# Patient Record
Sex: MALE | Race: WHITE | NOT HISPANIC OR LATINO | Employment: OTHER | URBAN - METROPOLITAN AREA
[De-identification: names, ages, dates, MRNs, and addresses within clinical notes are randomized per-mention and may not be internally consistent; named-entity substitution may affect disease eponyms.]

---

## 2017-07-25 ENCOUNTER — OFFICE VISIT (OUTPATIENT)
Dept: OPHTHALMOLOGY | Facility: CLINIC | Age: 57
End: 2017-07-25
Attending: OPHTHALMOLOGY
Payer: OTHER MISCELLANEOUS

## 2017-07-25 DIAGNOSIS — Z98.890 HISTORY OF RUPTURED GLOBE REPAIR: Primary | ICD-10-CM

## 2017-07-25 DIAGNOSIS — H35.372 ERM OS (EPIRETINAL MEMBRANE, LEFT EYE): ICD-10-CM

## 2017-07-25 DIAGNOSIS — H25.11 SENILE NUCLEAR SCLEROSIS, RIGHT: ICD-10-CM

## 2017-07-25 PROCEDURE — 92015 DETERMINE REFRACTIVE STATE: CPT | Mod: ZF

## 2017-07-25 PROCEDURE — 99214 OFFICE O/P EST MOD 30 MIN: CPT | Mod: ZF

## 2017-07-25 ASSESSMENT — CONF VISUAL FIELD
OD_NORMAL: 1
OS_NORMAL: 1

## 2017-07-25 ASSESSMENT — SLIT LAMP EXAM - LIDS
COMMENTS: NORMAL
COMMENTS: NORMAL

## 2017-07-25 ASSESSMENT — REFRACTION_MANIFEST
OD_SPHERE: +0.50
OD_CYLINDER: +0.50
OS_SPHERE: -2.00
OS_CYLINDER: +1.75
OD_ADD: +2.50
OD_AXIS: 008
OS_AXIS: 055
OS_ADD: +2.50

## 2017-07-25 ASSESSMENT — VISUAL ACUITY
OS_CC: 20/25+1
CORRECTION_TYPE: GLASSES
OD_CC: 20/20
METHOD: SNELLEN - LINEAR

## 2017-07-25 ASSESSMENT — TONOMETRY
OS_IOP_MMHG: 10
OD_IOP_MMHG: 10
IOP_METHOD: APPLANATION

## 2017-07-25 ASSESSMENT — CUP TO DISC RATIO
OD_RATIO: 0.2
OS_RATIO: 0.2

## 2017-07-25 NOTE — PROGRESS NOTES
CC -   H/o ruptured globe repair    INTERVAL HISTORY -   No new changes, vision stable    HPI -     Joce Maier is a  57 year old year-old patient presenting for h/o OGR left eye after injury with metal pin for concrete construction.      PAST OCULAR SURGERY  PPV/SBP left eye  2008 (JT)  Sutured 18.0 D CZ70BD lens left eye 2008 (Emperatriz)         ASSESSMENT & PLAN    1.  H/o OGR left eye   - s/p AC  reconstrcuction and sutured lens (Awan)   - s/p Pars plana vitrectomy (PPV)/SBP (JT)   - looks good today   - retina falt    2.  vitreous syneresis right eye   - advised S/Sx Retinal detachment     3. Tr nuclear sclerosis right eye    4.  Tr ERM OS   - not visually significant, repeat OCT next visit    5. 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 Thompson with OCT mac     Andrew Lara MD, PhD  Vitreoretinal Surgery Fellow  [facility]      ATTESTATION     Attending Physician Attestation:      Complete documentation of historical and exam elements from today's encounter can be found in the full encounter summary report (not reduplicated in this progress note).  I personally obtained the chief complaint(s) and history of present illness.  I confirmed and edited as necessary the review of systems, past medical/surgical history, family history, social history, and examination findings as documented by others; and I examined the patient myself.  I personally reviewed the relevant tests, images, and reports as documented above.  I formulated and edited as necessary the assessment and plan and discussed the findings and management plan with the patient and family        Katie Thompson MD, PhD  , Vitreoretinal Surgery  Department of Ophthalmology  [facility]  [place]

## 2017-07-25 NOTE — PATIENT INSTRUCTIONS
Please arrange to get a carotid (neck) ultrasound and a transthoracic cardiac (heart) ultrasound due to possible cholesterol plaques in your left eye

## 2017-07-25 NOTE — NURSING NOTE
Chief Complaints and History of Present Illnesses   Patient presents with     Follow Up For     OGR      HPI    Affected eye(s):  Both   Symptoms:     No blurred vision   No decreased vision   No flashes   No Dryness   No itching         Do you have eye pain now?:  No      Comments:  No VA changes or complaints. No pain or discomfort.   Elena SANTANA July 25, 2017 8:48 AM

## 2017-07-25 NOTE — MR AVS SNAPSHOT
After Visit Summary   7/25/2017    Joce Maier    MRN: [id number]           Patient Information     Date Of Birth          1960        Visit Information        Provider Department      7/25/2017 8:45 AM Katie Thompson MD Eye Clinic        Today's Diagnoses     History of ruptured globe repair    -  1    ERM OS (epiretinal membrane, left eye)        Senile nuclear sclerosis, right          Care Instructions    Please arrange to get a carotid (neck) ultrasound and a transthoracic cardiac (heart) ultrasound due to possible cholesterol plaques in your left eye          Follow-ups after your visit        Follow-up notes from your care team     Return in about 1 year (around 7/25/2018) for OCT OU.      Your next 10 appointments already scheduled     May 08, 2018  7:30 AM CDT   RETURN CORNEA with Stefan Reid MD   Eye Clinic ([facility])    [address] [place] [id number] [phone]            May 08, 2018  8:45 AM CDT   RETURN RETINA with Katie Thompson MD   Eye Clinic ([facility])    [address] [place] [id number] [phone]              Future tests that were ordered for you today     Open Future Orders        Priority Expected Expires Ordered    OCT Retina Spectralis OU (both eyes) Routine  1/26/2019 7/25/2017    OCT Retina Spectralis OU (both eyes) Routine  1/26/2019 7/25/2017            Who to contact     Please call your clinic at [phone] to:    Ask questions about your health    Make or cancel appointments    Discuss your medicines    Learn about your test results    Speak to your doctor   If you have compliments or concerns about an experience at your clinic, or if you wish to file a complaint, please contact [facility] Patient Relations at [phone] or email us at [email or web address]          Additional Information About Your Visit        Radio Waves Information     Radio Waves is an electronic gateway that provides easy, online access to your medical records. With Radio Waves, you can request a clinic appointment, read your test results, renew a prescription or communicate with your care team.     To sign up for Radio Waves visit the website at [email or web address]   You will be asked to enter the access code listed below, as well as some personal information. Please follow the directions to create your username and password.     Your access code is: [email or web address]  Expires: 2017  6:30 AM     Your access code will  in 90 days. If you need help or a new code, please contact your [facility] or call [phone] for assistance.        Care EveryWhere ID     This is your Care EveryWhere ID. This could be used by other organizations to access your [place] medical records  [id number]         Blood Pressure from Last 3 Encounters:   No data found for BP    Weight from Last 3 Encounters:   No data found for Wt               Primary Care Provider Office Phone # Fax #    [facility] [phone]       [address] [address]        Equal Access to Services     BRITNEY CARVAJAL : Hadii shira muñozo Sojarvis, waaxda luqadaha, qaybta kaalmada adecamilleyada, titi castaneda. So [facility] [phone].    ATENCIÓN: Si habla español, tiene a hernandez disposición servicios gratuitos de asistencia lingüística. Parminder al [phone].    We comply with applicable federal civil rights laws and [place] laws. We do not discriminate on the basis of race, color, national origin, age, disability sex, sexual orientation or gender identity.            Thank you!     Thank you for choosing EYE CLINIC  for your care. Our goal is always to provide you with excellent care. Hearing back from our patients is one way we can continue to improve our  services. Please take a few minutes to complete the written survey that you may receive in the mail after your visit with us. Thank you!             Your Updated Medication List - Protect others around you: Learn how to safely use, store and throw away your medicines at www.disposemymeds.org.          This list is accurate as of: 7/25/17  9:50 AM.  Always use your most recent med list.                   Brand Name Dispense Instructions for use Diagnosis    ALEVE PO      Take 1 tablet by mouth daily

## 2018-05-08 ENCOUNTER — OFFICE VISIT (OUTPATIENT)
Dept: OPHTHALMOLOGY | Facility: CLINIC | Age: 58
End: 2018-05-08
Attending: OPHTHALMOLOGY
Payer: OTHER MISCELLANEOUS

## 2018-05-08 DIAGNOSIS — Z98.890 HISTORY OF RUPTURED GLOBE REPAIR: ICD-10-CM

## 2018-05-08 DIAGNOSIS — H25.11 SENILE NUCLEAR SCLEROSIS, RIGHT: ICD-10-CM

## 2018-05-08 DIAGNOSIS — H35.372 EPIRETINAL MEMBRANE (ERM) OF LEFT EYE: ICD-10-CM

## 2018-05-08 DIAGNOSIS — H02.056 TRICHIASIS OF LEFT EYE WITHOUT ENTROPION: Primary | ICD-10-CM

## 2018-05-08 PROCEDURE — 40000269 ZZH STATISTIC NO CHARGE FACILITY FEE: Mod: ZF

## 2018-05-08 PROCEDURE — G0463 HOSPITAL OUTPT CLINIC VISIT: HCPCS | Mod: ZF

## 2018-05-08 PROCEDURE — 92015 DETERMINE REFRACTIVE STATE: CPT | Mod: ZF

## 2018-05-08 PROCEDURE — 67820 REVISE EYELASHES: CPT | Mod: ZF | Performed by: OPHTHALMOLOGY

## 2018-05-08 PROCEDURE — 92134 CPTRZ OPH DX IMG PST SGM RTA: CPT | Mod: ZF | Performed by: OPHTHALMOLOGY

## 2018-05-08 ASSESSMENT — REFRACTION_MANIFEST
OD_SPHERE: +0.50
OS_CYLINDER: +2.00
OS_SPHERE: -1.75
OS_CYLINDER: +2.00
OD_CYLINDER: +0.50
OS_ADD: +2.50
OD_CYLINDER: +0.50
OD_ADD: +2.50
OS_AXIS: 050
OS_AXIS: 050
OD_ADD: +2.50
OS_ADD: +2.50
OD_SPHERE: +0.50
OD_AXIS: 008
OS_SPHERE: -1.75
OD_AXIS: 008

## 2018-05-08 ASSESSMENT — VISUAL ACUITY
OD_CC: 20/15
METHOD: SNELLEN - LINEAR
OD_CC: 20/15
CORRECTION_TYPE: GLASSES
OS_CC: 20/25
METHOD_MR: OPP SIGNS
METHOD: SNELLEN - LINEAR
CORRECTION_TYPE: GLASSES
OS_CC+: -1
OS_CC: 20/25
OS_CC+: -1

## 2018-05-08 ASSESSMENT — TONOMETRY
OD_IOP_MMHG: 12
IOP_METHOD: ICARE
OS_IOP_MMHG: 13
OD_IOP_MMHG: 12
IOP_METHOD: ICARE
OS_IOP_MMHG: 13

## 2018-05-08 ASSESSMENT — REFRACTION_WEARINGRX
OD_CYLINDER: +0.50
OS_CYLINDER: +1.75
OD_SPHERE: +0.50
OS_CYLINDER: +1.75
OD_ADD: +2.50
OS_AXIS: 055
OD_AXIS: 008
OD_CYLINDER: +0.50
OS_ADD: +2.50
OD_SPHERE: +0.50
OS_ADD: +2.50
OD_AXIS: 008
OS_SPHERE: -2.00
OS_AXIS: 055
OS_SPHERE: -2.00
OD_ADD: +2.50

## 2018-05-08 ASSESSMENT — CONF VISUAL FIELD
OS_NORMAL: 1
OD_NORMAL: 1
OD_NORMAL: 1
OS_NORMAL: 1

## 2018-05-08 ASSESSMENT — CUP TO DISC RATIO
OD_RATIO: 0.2
OD_RATIO: 0.2
OS_RATIO: 0.2
OS_RATIO: 0.2

## 2018-05-08 ASSESSMENT — SLIT LAMP EXAM - LIDS
COMMENTS: NORMAL

## 2018-05-08 NOTE — NURSING NOTE
Chief Complaints and History of Present Illnesses   Patient presents with     Follow Up For     OGP LE      HPI    Last Eye Exam:  7/25/17   Affected eye(s):  Left   Symptoms:     No floaters   No flashes      Duration:  1 year   Frequency:  Constant       Do you have eye pain now?:  No      Comments:  Pt would like to check the health of eyes. Vision is stable and good. Denies any floaters or flashing lights. Denies any pain or discomfort. CANDY ASH, COA 7:48 AM 05/08/2018

## 2018-05-08 NOTE — NURSING NOTE
Chief Complaints and History of Present Illnesses   Patient presents with     Follow Up For     Hx OGR LE      HPI    Last Eye Exam:  7/25/17   Affected eye(s):  Left   Symptoms:     No floaters   No flashes      Duration:  1 year   Frequency:  Constant       Do you have eye pain now?:  No      Comments:  Pt would like to check the health of eyes. Vision is stable and good. Denies any floaters or flashing lights. Denies any pain or discomfort. CANDY ASH, COA 7:48 AM 05/08/2018                
unknown

## 2018-05-08 NOTE — MR AVS SNAPSHOT
After Visit Summary   2018    Joce Maier    MRN: [id number]           Patient Information     Date Of Birth          1960        Visit Information        Provider Department      2018 8:45 AM Katie Thompson MD Eye Clinic        Today's Diagnoses     History of ruptured globe repair        Epiretinal membrane (ERM) of left eye        Senile nuclear sclerosis, right           Follow-ups after your visit        Follow-up notes from your care team     Return in about 1 year (around 2019) for f/u OGR OS, OCT OU.      Future tests that were ordered for you today     Open Future Orders        Priority Expected Expires Ordered    OCT Retina Spectralis OU (both eyes) Routine  2019            Who to contact     Please call your clinic at [phone] to:    Ask questions about your health    Make or cancel appointments    Discuss your medicines    Learn about your test results    Speak to your doctor            Additional Information About Your Visit        MyChart Information     WaveCheck is an electronic gateway that provides easy, online access to your medical records. With WaveCheck, you can request a clinic appointment, read your test results, renew a prescription or communicate with your care team.     To sign up for WaveCheck visit the website at [email or web address]   You will be asked to enter the access code listed below, as well as some personal information. Please follow the directions to create your username and password.     Your access code is: [email or web address]  Expires: 2018  6:30 AM     Your access code will  in 90 days. If you need help or a new code, please contact your [facility] or call [phone] for assistance.        Care EveryWhere ID     This is your Care EveryWhere ID. This could be used by other organizations to access your [place] medical records  [id number]         Blood Pressure from Last 3  Encounters:   No data found for BP    Weight from Last 3 Encounters:   No data found for Wt              We Performed the Following     OCT Retina Spectralis OU (both eyes)        Primary Care Provider Office Phone # Fax #    [facility] [phone] [address] [facility] [address]        Equal Access to Services     BRINTEY CARVAJAL : Hadii aad ku hadasho Soomaali, waaxda luqadaha, qaybta kaalmada adeegyada, waxay dialloin haysherrien adecamille brownleeshilpajoe castaneda. So [facility] [phone].    ATENCIÓN: Si habla español, tiene a hernandez disposición servicios gratuitos de asistencia lingüística. Ll[facility] [phone].    We comply with applicable federal civil rights laws and [place] laws. We do not discriminate on the basis of race, color, national origin, age, disability, sex, sexual orientation, or gender identity.            Thank you!     Thank you for choosing EYE CLINIC  for your care. Our goal is always to provide you with excellent care. Hearing back from our patients is one way we can continue to improve our services. Please take a few minutes to complete the written survey that you may receive in the mail after your visit with us. Thank you!             Your Updated Medication List - Protect others around you: Learn how to safely use, store and throw away your medicines at www.disposemymeds.org.          This list is accurate as of 5/8/18 10:37 AM.  Always use your most recent med list.                   Brand Name Dispense Instructions for use Diagnosis    ALEVE PO      Take 1 tablet by mouth daily

## 2018-05-08 NOTE — MR AVS SNAPSHOT
After Visit Summary   2018    Joce Maier    MRN: [id number]           Patient Information     Date Of Birth          1960        Visit Information        Provider Department      2018 7:30 AM Stefan Reid MD Eye [facility]        Today's Diagnoses     Trichiasis of left eye without entropion    -  1    History of ruptured globe repair        Senile nuclear sclerosis, right           Follow-ups after your visit        Follow-up notes from your care team     Return in about 2 years (around 2020).      Who to contact     Please call your clinic at [phone] to:    Ask questions about your health    Make or cancel appointments    Discuss your medicines    Learn about your test results    Speak to your doctor            Additional Information About Your Visit        MyChart Information     Xceleron (Chapter [employer]hart is an electronic gateway that provides easy, online access to your medical records. With [employer]ship, you can request a clinic appointment, read your test results, renew a prescription or communicate with your care team.     To sign up for Speakeasy Inct visit the website at [email or web address]   You will be asked to enter the access code listed below, as well as some personal information. Please follow the directions to create your username and password.     Your access code is: [email or web address]  Expires: 2018  6:30 AM     Your access code will  in 90 days. If you need help or a new code, please contact your [facility] or call [phone] for assistance.        Care EveryWhere ID     This is your Care EveryWhere ID. This could be used by other organizations to access your [place] medical records  [id number]         Blood Pressure from Last 3 Encounters:   No data found for BP    Weight from Last 3 Encounters:   No data found for Wt              We Performed the Following     Epilation of Trichiasis, Forceps        Primary Care Provider Office  Phone # Fax #    [facility] [phone] [address] [facility] [address]        Equal Access to Services     BRITNEY CARVAJAL : Hadii aad ku hadborisjerrica Thibodeaux, florecitanina ribeiromaryamha, gretta kakoreyda dwaine, titi munroe juancamille le martha castaneda. So [facility] [phone].    ATENCIÓN: Si habla español, tiene a hernandez disposición servicios gratuitos de asistencia lingüística. Llame al [phone].    We comply with applicable federal civil rights laws and [place] laws. We do not discriminate on the basis of race, color, national origin, age, disability, sex, sexual orientation, or gender identity.            Thank you!     Thank you for choosing EYE CLINIC  for your care. Our goal is always to provide you with excellent care. Hearing back from our patients is one way we can continue to improve our services. Please take a few minutes to complete the written survey that you may receive in the mail after your visit with us. Thank you!             Your Updated Medication List - Protect others around you: Learn how to safely use, store and throw away your medicines at www.disposemymeds.org.          This list is accurate as of 5/8/18  9:23 AM.  Always use your most recent med list.                   Brand Name Dispense Instructions for use Diagnosis    ALEVE PO      Take 1 tablet by mouth daily

## 2018-05-08 NOTE — PROGRESS NOTES
I have confirmed the patient's and reviewed Past Medical History, Past Surgical History, Social History, Family History, Problem List, Medication List and agree with Tech note.    Chief Complaints and History of Present Illnesses   Patient presents with     Yearly Exam     ruptured globe repair left eye     Interval History: denies vision change, no flashes or floaters    History:   Ruptured globe: concrete pin to left eye   - 2008   - s/p Pars plana vitrectomy (PPV)/PPL/SB    - with sutured 18.0 D CZ70BD lens (Dr. Awan) 2008    Assessment/plan:       Joce Maier is a 57 year old male who presents with:     1. Ruptured globe of left eye, subsequent encounter    2. History of ruptured globe repair  Doing very well with excellent visual outcome  Sutured intraocular lens in good position  Excellent best corrected visual acuity with manifest refraction   Updated manifest refraction given  Monocular precautions given  Recommend polycarbonate lenses at all times  To see Dr. eSgura today    3. Corectopia  Does not seem bothered by mild increased light sensitivity  Monitor for now      return to clinic 1 year    Branden Jarvis MD  PGY3, Dept of Ophthalmology  Pager [phone]        ~~~~~~~~~~~~~~~~~~~~~~~~~~~~~~~~~~~~~~~~~~~~~~~~~~~~~~~~~~~~~~~~    Complete documentation of historical and exam elements from today's encounter can be found in the full encounter summary report (not reduplicated in this progress note). I personally obtained the chief complaint(s) and history of present illness.  I confirmed and edited as necessary the review of systems, past medical/surgical history, family history, social history, and examination findings as documented by others.  I examined the patient myself, and I personally reviewed the relevant tests, images, and reports as documented above. I formulated and edited as necessary the assessment and plan and discussed the findings and management plan with the patient and family.      Stefan Reid MD, MA  Director, Cornea & Anterior Segment  [facility] Department of Ophthalmology & Visual Neuroscience

## 2019-05-06 ENCOUNTER — OFFICE VISIT (OUTPATIENT)
Dept: OPHTHALMOLOGY | Facility: CLINIC | Age: 59
End: 2019-05-06
Attending: OPHTHALMOLOGY
Payer: OTHER MISCELLANEOUS

## 2019-05-06 DIAGNOSIS — H35.372 EPIRETINAL MEMBRANE (ERM) OF LEFT EYE: ICD-10-CM

## 2019-05-06 DIAGNOSIS — Z98.890 HISTORY OF RUPTURED GLOBE REPAIR: ICD-10-CM

## 2019-05-06 PROBLEM — Z80.0 FH: ESOPHAGEAL CANCER: Status: ACTIVE | Noted: 2019-05-06

## 2019-05-06 PROBLEM — K29.80 DUODENITIS: Status: ACTIVE | Noted: 2019-05-06

## 2019-05-06 PROBLEM — R10.13 DYSPEPSIA: Status: ACTIVE | Noted: 2019-05-06

## 2019-05-06 PROBLEM — R14.0 ABDOMINAL BLOATING: Status: ACTIVE | Noted: 2019-05-06

## 2019-05-06 PROCEDURE — G0463 HOSPITAL OUTPT CLINIC VISIT: HCPCS | Mod: ZF

## 2019-05-06 PROCEDURE — 92134 CPTRZ OPH DX IMG PST SGM RTA: CPT | Mod: ZF | Performed by: OPHTHALMOLOGY

## 2019-05-06 ASSESSMENT — EXTERNAL EXAM - RIGHT EYE: OD_EXAM: NORMAL

## 2019-05-06 ASSESSMENT — TONOMETRY
OD_IOP_MMHG: 15
IOP_METHOD: ICARE
OS_IOP_MMHG: 17

## 2019-05-06 ASSESSMENT — VISUAL ACUITY
OS_CC+: -1
METHOD: SNELLEN - LINEAR
OD_CC: 20/15
CORRECTION_TYPE: GLASSES
OD_CC+: -2
OS_CC: 20/25

## 2019-05-06 ASSESSMENT — SLIT LAMP EXAM - LIDS
COMMENTS: NORMAL
COMMENTS: NORMAL

## 2019-05-06 ASSESSMENT — EXTERNAL EXAM - LEFT EYE: OS_EXAM: NORMAL

## 2019-05-06 ASSESSMENT — CONF VISUAL FIELD
OS_NORMAL: 1
OD_NORMAL: 1
METHOD: COUNTING FINGERS

## 2019-05-06 ASSESSMENT — CUP TO DISC RATIO
OD_RATIO: 0.2
OS_RATIO: 0.2

## 2019-05-06 NOTE — PROGRESS NOTES
CC -   H/o ruptured globe repair    INTERVAL HISTORY -   No new changes, vision stable. No eye pain, flashes, floaters.    HPI -     Joce Maier is a  57 year old year-old patient presenting for h/o OGR left eye after injury with metal pin for concrete construction.      PAST OCULAR SURGERY  PPV/SBP left eye  2008 (JT)  Sutured 18.0 D CZ70BD lens left eye 2008 (Awan)    RETINAL MAGING  OCT 5-6-19  OD- normal retina, PHF attached  OS- tr ERM, mild outer retina irregularity nasally      ASSESSMENT & PLAN    1.  H/o OGR left eye   - s/p AC  reconstrcuction and sutured lens (Awan)   - s/p Pars plana vitrectomy (PPV)/SBP (JT)   - looks good today   - retina flat    2.  vitreous syneresis right eye   - advised S/Sx Retinal detachment 5/2019    3. Tr nuclear sclerosis right eye   - observe    4.  Tr ERM OS   - not visually significant, repeat OCT next visit    5. 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 Thompsno with OCT mac           ATTESTATION     Attending Physician Attestation:      Complete documentation of historical and exam elements from today's encounter can be found in the full encounter summary report (not reduplicated in this progress note).  I personally obtained the chief complaint(s) and history of present illness.  I confirmed and edited as necessary the review of systems, past medical/surgical history, family history, social history, and examination findings as documented by others; and I examined the patient myself.  I personally reviewed the relevant tests, images, and reports as documented above.  I formulated and edited as necessary the assessment and plan and discussed the findings and management plan with the patient and family    Katie Thompson MD, PhD  , Vitreoretinal Surgery  Department of Ophthalmology  [facility]

## 2019-05-06 NOTE — NURSING NOTE
Chief Complaints and History of Present Illnesses   Patient presents with     Follow Up For Surgery Of Eye     Chief Complaint(s) and History of Present Illness(es)     Follow Up For Surgery Of Eye     Laterality: left eye    Course: stable    Associated symptoms: Negative for double vision, dryness, eye pain, tearing, pain with eye movement, flashes and floaters    Pain scale: 0/10              Comments     Pt states has no new concerns since last visit.  Jacqui WILSON 8:29 AM 05/06/2019

## 2019-06-17 PROBLEM — H35.372 EPIRETINAL MEMBRANE (ERM) OF LEFT EYE: Status: ACTIVE | Noted: 2017-07-25

## 2020-01-02 ENCOUNTER — TELEPHONE (OUTPATIENT)
Dept: OPHTHALMOLOGY | Facility: CLINIC | Age: 60
End: 2020-01-02

## 2020-06-09 ENCOUNTER — OFFICE VISIT (OUTPATIENT)
Dept: OPHTHALMOLOGY | Facility: CLINIC | Age: 60
End: 2020-06-09
Attending: OPHTHALMOLOGY
Payer: OTHER MISCELLANEOUS

## 2020-06-09 DIAGNOSIS — Z98.890 HISTORY OF RUPTURED GLOBE REPAIR: ICD-10-CM

## 2020-06-09 DIAGNOSIS — Z98.890 HISTORY OF RUPTURED GLOBE REPAIR: Primary | ICD-10-CM

## 2020-06-09 DIAGNOSIS — H35.372 EPIRETINAL MEMBRANE (ERM) OF LEFT EYE: ICD-10-CM

## 2020-06-09 PROCEDURE — 40000269 ZZH STATISTIC NO CHARGE FACILITY FEE: Mod: ZF

## 2020-06-09 PROCEDURE — 92134 CPTRZ OPH DX IMG PST SGM RTA: CPT | Mod: ZF | Performed by: OPHTHALMOLOGY

## 2020-06-09 PROCEDURE — G0463 HOSPITAL OUTPT CLINIC VISIT: HCPCS | Mod: ZF

## 2020-06-09 ASSESSMENT — EXTERNAL EXAM - LEFT EYE
OS_EXAM: NORMAL
OS_EXAM: NORMAL

## 2020-06-09 ASSESSMENT — REFRACTION_WEARINGRX
OS_SPHERE: -2.00
OD_AXIS: 008
OD_SPHERE: +0.50
OS_AXIS: 055
OS_CYLINDER: +1.75
OD_CYLINDER: +0.50
OS_ADD: +2.50
OD_ADD: +2.50

## 2020-06-09 ASSESSMENT — VISUAL ACUITY
OS_CC+: +
OS_CC: 20/25
CORRECTION_TYPE: GLASSES
OD_CC: 20/20
METHOD: SNELLEN - LINEAR
OS_CC: 20/25+
METHOD: SNELLEN - LINEAR
OD_CC: 20/20

## 2020-06-09 ASSESSMENT — TONOMETRY
IOP_METHOD: TONOPEN
OS_IOP_MMHG: 21
IOP_METHOD: TONOPEN
OS_IOP_MMHG: 21
OD_IOP_MMHG: 14
OD_IOP_MMHG: 14

## 2020-06-09 ASSESSMENT — CONF VISUAL FIELD
METHOD: COUNTING FINGERS
OD_NORMAL: 1
OS_NORMAL: 1

## 2020-06-09 ASSESSMENT — SLIT LAMP EXAM - LIDS
COMMENTS: NORMAL

## 2020-06-09 ASSESSMENT — CUP TO DISC RATIO
OS_RATIO: 0.2
OS_RATIO: 0.2
OD_RATIO: 0.2
OD_RATIO: 0.2

## 2020-06-09 ASSESSMENT — EXTERNAL EXAM - RIGHT EYE
OD_EXAM: NORMAL
OD_EXAM: NORMAL

## 2020-06-09 NOTE — NURSING NOTE
Chief Complaints and History of Present Illnesses   Patient presents with     Annual Eye Exam     Chief Complaint(s) and History of Present Illness(es)     Annual Eye Exam     Laterality: both eyes    Associated symptoms: Negative for dryness, eye pain, redness, floaters and tearing    Pain scale: 0/10              Comments     He states that his vision has seemed stable in both eyes, since his last eye exam.  Patient denies having any eye discomfort.     KATIE Avila 12:54 PM  June 9, 2020

## 2020-06-09 NOTE — PROGRESS NOTES
CC: Ruptured globe repair/ sutured IOL    HPI:  Ruptured globe with repair: concrete pin to eye 2008  - s/p Pars plana vitrectomy (PPV)/PPL/SB   - with sutured 18.0 D CZ70BD lens (Dr. Awan) 2008    Gtts:  PFAT PRN in winter with dryness      Assessment/plan:  Joce Maier is a 60 year old male who presents with:     # Ruptured globe of left eye, subsequent encounter  s/p ruptured globe repair 2008  S/p sutured 18.0 D CZ70BD lens (Dr. Awan) 2008   - Doing very well with excellent visual outcome   - Sutured intraocular lens in good position   - Excellent best corrected visual acuity with manifest refraction   - recommend polarized/transition lenses to help with glare.     # Corectopia, left eye   - Does not seem bothered by mild increased light sensitivity   - Monitor for now    # nuclear sclerosis, right eye   - not bothersome to pt at this time   - monitor for now.    #ERM, OS  #vitreous syneresis, right eye   - Follows with Dr. Thompson yearly    Return to clinic 1 year with Dr. Thompson, and 2 years with Dr. Awan.    --  Tae Fu, DO  PGY 5, Cornea Fellow  Ophthalmology  Attending Physician Attestation:  Complete documentation of historical and exam elements from today's encounter can be found in the full encounter summary report (not reduplicated in this progress note).  I personally obtained the chief complaint(s) and history of present illness.  I confirmed and edited as necessary the review of systems, past medical/surgical history, family history, social history, and examination findings as documented by others; and I examined the patient myself.  I personally reviewed the relevant tests, images, and reports as documented above.  I formulated and edited as necessary the assessment and plan and discussed the findings and management plan with the patient and family. - Heladio Awan MD

## 2020-06-09 NOTE — NURSING NOTE
Chief Complaints and History of Present Illnesses   Patient presents with     Follow Up     Trichiasis of left eye without entropion     Chief Complaint(s) and History of Present Illness(es)     Follow Up     Laterality: left eye    Associated symptoms: Negative for dryness, eye pain, redness and floaters    Pain scale: 0/10    Comments: Trichiasis of left eye without entropion              Comments     He states that his vision has seemed stable in both eyes, since his last eye exam.  Patient denies having any eye discomfort.     Lulú Aviles, COT 12:54 PM  June 9, 2020

## 2020-06-09 NOTE — PROGRESS NOTES
CC -   H/o ruptured globe repair    INTERVAL HISTORY -   No new changes, vision stable. No eye pain, flashes, floaters.    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      PAST OCULAR SURGERY  OGR OS 2008  PPV/SBP OS  2008 (JT)  Sutured 18.0 D CZ70BD lens left eye 2008 (Awan)    RETINAL MAGING  OCT 6-9-20  OD- normal retina, PHF attached  OS- tr ERM, mild outer retina irregularity nasally; no change compared to 2019      ASSESSMENT & PLAN    1.  H/o OGR left eye 2008   - s/p AC  reconstrcuction and sutured lens (Awan)   - s/p Pars plana vitrectomy (PPV)/SBP (JT)   - looks good today   - retina flat    2.  vitreous syneresis right eye   - advised S/Sx Retinal detachment 6/2020    3. Tr nuclear sclerosis right eye   - observe    4.  Tr ERM OS   - not visually significant, repeat OCT next visit    5. 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 Thompson with OCT elizabeth Cooper MD  Vitreoretinal surgery fellow  [facility]      ATTESTATION     Attending Physician Attestation:      Complete documentation of historical and exam elements from today's encounter can be found in the full encounter summary report (not reduplicated in this progress note).  I personally obtained the chief complaint(s) and history of present illness.  I confirmed and edited as necessary the review of systems, past medical/surgical history, family history, social history, and examination findings as documented by others; and I examined the patient myself.  I personally reviewed the relevant tests, images, and reports as documented above.  I personally reviewed the ophthalmic test(s) associated with this encounter, agree with the interpretation(s) as documented by the resident/fellow, and have  edited the corresponding report(s) as necessary.   I formulated and edited as necessary the assessment and plan and discussed the findings and management plan with the patient and family    Katie Thompson MD, PhD  , Vitreoretinal Surgery  Department of Ophthalmology  [facility]

## 2021-03-29 ENCOUNTER — TELEPHONE (OUTPATIENT)
Dept: OPHTHALMOLOGY | Facility: CLINIC | Age: 61
End: 2021-03-29

## 2021-06-02 DIAGNOSIS — Z98.890 HISTORY OF RUPTURED GLOBE REPAIR: Primary | ICD-10-CM

## 2021-06-18 ENCOUNTER — OFFICE VISIT (OUTPATIENT)
Dept: OPHTHALMOLOGY | Facility: CLINIC | Age: 61
End: 2021-06-18
Attending: OPHTHALMOLOGY
Payer: OTHER MISCELLANEOUS

## 2021-06-18 DIAGNOSIS — H35.372 EPIRETINAL MEMBRANE (ERM) OF LEFT EYE: ICD-10-CM

## 2021-06-18 DIAGNOSIS — Z98.890 HISTORY OF RUPTURED GLOBE REPAIR: Primary | ICD-10-CM

## 2021-06-18 PROCEDURE — 99213 OFFICE O/P EST LOW 20 MIN: CPT | Mod: GC | Performed by: OPHTHALMOLOGY

## 2021-06-18 PROCEDURE — G0463 HOSPITAL OUTPT CLINIC VISIT: HCPCS | Mod: 25

## 2021-06-18 PROCEDURE — 92015 DETERMINE REFRACTIVE STATE: CPT | Performed by: OPHTHALMOLOGY

## 2021-06-18 PROCEDURE — 92134 CPTRZ OPH DX IMG PST SGM RTA: CPT | Performed by: OPHTHALMOLOGY

## 2021-06-18 ASSESSMENT — REFRACTION_WEARINGRX
OS_CYLINDER: +1.75
OS_SPHERE: -2.00
OD_AXIS: 008
OD_SPHERE: +0.50
OD_ADD: +2.50
OS_AXIS: 055
OD_CYLINDER: +0.50
OS_ADD: +2.50

## 2021-06-18 ASSESSMENT — VISUAL ACUITY
OS_CC+: -3
OD_CC: 20/20
METHOD: SNELLEN - LINEAR
OD_CC+: -1
OS_PH_CC: 20/20
OS_PH_CC+: -1
OS_CC: 20/25

## 2021-06-18 ASSESSMENT — CONF VISUAL FIELD
METHOD: COUNTING FINGERS
OS_NORMAL: 1
OD_NORMAL: 1

## 2021-06-18 ASSESSMENT — SLIT LAMP EXAM - LIDS
COMMENTS: NORMAL
COMMENTS: NORMAL

## 2021-06-18 ASSESSMENT — CUP TO DISC RATIO
OS_RATIO: 0.2
OD_RATIO: 0.2

## 2021-06-18 ASSESSMENT — REFRACTION_MANIFEST
OS_CYLINDER: +2.00
OD_ADD: +2.50
OS_SPHERE: -2.00
OD_SPHERE: +0.50
OS_AXIS: 052
OS_ADD: +2.50
OD_AXIS: 008
OD_CYLINDER: +0.50

## 2021-06-18 ASSESSMENT — TONOMETRY
OS_IOP_MMHG: 13
IOP_METHOD: TONOPEN
OD_IOP_MMHG: 13

## 2021-06-18 ASSESSMENT — EXTERNAL EXAM - LEFT EYE: OS_EXAM: NORMAL

## 2021-06-18 ASSESSMENT — EXTERNAL EXAM - RIGHT EYE: OD_EXAM: NORMAL

## 2021-06-18 NOTE — PROGRESS NOTES
CC -   H/o ruptured globe repair    INTERVAL HISTORY -   No new changes, vision stable. No eye pain, flashes, floaters.    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      PAST OCULAR SURGERY  OGR OS 2008  PPV/SBP OS  2008 (JT)  Sutured 18.0 D CZ70BD  OS  2008 (Awan)    RETINAL MAGING  OCT 6-18-21  OD- normal retina, PHF attached  OS- tr ERM, mild outer retina irregularity nasally; stable      ASSESSMENT & PLAN    #.  H/o OGR left eye 2008   - s/p AC  reconstrcuction and sutured lens (Awan)   - s/p Pars plana vitrectomy (PPV)/SBP (JT)   - looks good today   - retina flat    #.  vitreous syneresis right eye   - advised S/Sx Retinal detachment 6/2021    #. Tr NS OD   - observe   - MRx updated    #  Tr ERM OS   - not visually significant, repeat OCT next visit    #. H/o Hollenhorst plaque and peripheral IRH OS   - noted 7/2017 IT arcade   - had w/u by PCP normal 2017 (carotid & TTE)      return to clinic: 1 year with Dr. Thompson with OCT mac     Gurpreet Landry MD  Ophthalmology PGY-3      ATTESTATION     Attending Physician Attestation:      Complete documentation of historical and exam elements from today's encounter can be found in the full encounter summary report (not reduplicated in this progress note).  I personally obtained the chief complaint(s) and history of present illness.  I confirmed and edited as necessary the review of systems, past medical/surgical history, family history, social history, and examination findings as documented by others; and I examined the patient myself.  I personally reviewed the relevant tests, images, and reports as documented above.  I personally reviewed the ophthalmic test(s) associated with this encounter, agree with the interpretation(s) as documented by the resident/fellow, and have edited the corresponding report(s) as necessary.   I formulated and edited as  necessary the assessment and plan and discussed the findings and management plan with the patient and family    Katie Thompson MD, PhD  , Vitreoretinal Surgery  Department of Ophthalmology  [facility]

## 2021-06-18 NOTE — NURSING NOTE
Chief Complaints and History of Present Illnesses   Patient presents with     Ruptured Globe Follow Up     Chief Complaint(s) and History of Present Illness(es)     Ruptured Globe Follow Up     Laterality: left eye    Associated signs and symptoms: Negative for eye pain    Pain scale: 0/10    Course: resolved              Comments     Joce is here to follow up on  ruptured globe repair. He says LE feels good and sees well. No ocular concerns today.     Mehdi Dey COT 8:07 AM June 18, 2021

## 2021-09-06 ENCOUNTER — OFFICE VISIT (OUTPATIENT)
Dept: OPHTHALMOLOGY | Facility: CLINIC | Age: 61
End: 2021-09-06

## 2021-09-06 DIAGNOSIS — T85.22XA DISLOCATED IOL (INTRAOCULAR LENS), POSTERIOR, LEFT: ICD-10-CM

## 2021-09-06 DIAGNOSIS — Z98.890 HISTORY OF RUPTURED GLOBE REPAIR: Primary | ICD-10-CM

## 2021-09-06 PROCEDURE — 99207 PR SERVICE NOT STAFFED W/SUPERV PROV: CPT | Performed by: STUDENT IN AN ORGANIZED HEALTH CARE EDUCATION/TRAINING PROGRAM

## 2021-09-06 ASSESSMENT — VISUAL ACUITY
METHOD: SNELLEN - LINEAR
OD_CC: 20/15
OS_CC: CF5'
OS_PH_CC: 20/200

## 2021-09-06 ASSESSMENT — TONOMETRY
OD_IOP_MMHG: 11
OD_IOP_MMHG: 6
IOP_METHOD: APPLANATION
OS_IOP_MMHG: 5
OS_IOP_MMHG: 12
IOP_METHOD: TONOPEN

## 2021-09-06 ASSESSMENT — EXTERNAL EXAM - LEFT EYE: OS_EXAM: NORMAL

## 2021-09-06 ASSESSMENT — EXTERNAL EXAM - RIGHT EYE: OD_EXAM: NORMAL

## 2021-09-06 ASSESSMENT — CUP TO DISC RATIO
OD_RATIO: 0.2
OS_RATIO: 0.2

## 2021-09-06 ASSESSMENT — SLIT LAMP EXAM - LIDS
COMMENTS: NORMAL
COMMENTS: NORMAL

## 2021-09-06 NOTE — PROGRESS NOTES
OPHTHALMOLOGY CONSULT NOTE  09/06/21    Patient: Joce Maier    HISTORY OF PRESENTING ILLNESS:     Joce Maier is a 61 year old male presenting with blurred vision in the left eye.     Patient reports that he had a hard time going to bed last night, so woke up and was playing solitary inhis bed when he noticed that the top half of his left eye felt blurry. He went to sleep and woke and reports that the whole eye feels blurred and he knew something wasn't right at all. He came here right away for evaluation - drove about 3 hours to get here today.    In 2008, he had an open globe injury and underwent a ruptured globe repair and subsequent other surgeries, including PPV, and subsequently had AC reconstruction with a sutured lens.    He denied any trauma to the eye (although states that last week his glass may have poked his eye a little bit). He went to bed fine last night, and got up not seeing well without any new trauma. He denies any flashes of lights. No new floaters. He has been keeping his eyes closed.    Patient's last visit with Dr. Thompson was 6/18/2021 - at that point he was being followed with a history of a ruptured globe in the left eye and subsequently had AC reconstruction with a sutured lens. Also required a PPV in the past. During last visit 6/18/21 - the retina was flat.    Had a hardboiled egg at 8 AM, and water until 9:30.    10+ review of systems were otherwise negative except for that which has been stated above.    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        Past Surgical History:   Procedure Laterality  Date     CATARACT IOL, RT/LT Left 5/28/08    Iris sutured lens with iridoplasty      REPAIR RUPTURED GLOBE Left 3/12/08     VITRECTOMY PARSPLANA Left 3/26/08    PPV, PPL, gas LE     Family History:  N/A    Social History:  Here with wife 3 hours away via driving     EXAMINATION:     Base Eye Exam     Visual Acuity (Snellen - Linear)       Right Left    Dist cc 20/15 CF5'    Dist ph cc  20/200          Tonometry (Tonopen, 12:51 PM)       Right Left    Pressure 6 5          Tonometry #2 (Applanation, 2:37 PM)       Right Left    Pressure 11 12          Pupils       Dark Light Shape React    Right 3 2 Round Brisk    Left   Irregular none          Neuro/Psych     Oriented x3: Yes    Mood/Affect: Normal          Dilation     Both eyes: 2.5% Jose Synephrine, 1.0% Mydriacyl @ 12:38 PM            Slit Lamp and Fundus Exam     External Exam       Right Left    External Normal Normal          Slit Lamp Exam       Right Left    Lids/Lashes Normal Normal    Conjunctiva/Sclera White and quiet White and quiet    Cornea Clear Clear    Anterior Chamber Deep and quiet Deep and quiet    Iris Dilated iridodialysis nasally with irregular pupil , multiple TIDs    Lens 1+ NS No lens - has fallen into vitreous    Vitreous syneresis clear avitric, 3-piece IOL in inferonasal aspect resting on retina           Fundus Exam       Right Left    Disc Normal Normal    C/D Ratio 0.2 0.2    Macula Normal Tr ERM    Vessels Normal Normal    Periphery Normal , retina attached - no heme or retinal detachment seen               Labs/Studies/Imaging Performed  N/A     ASSESSMENT/PLAN:     Joce Maier is a 61 year old male with a hx of open globe in 2008, requiring ruptured globe repair, PPV, and subsequent placement of scleral fixed IOL, now here with spontaneous IOL displacement into vitreous cavity in the left eye.     # Dislocated Intraocular lens resting in vitreous cavity, Left Eye  Patient presented with spontaneous blurred vision in the  left eye.  Of note, history of open globe injury in 2008, requiring vitrectomy, scleral buckle, furthermore he received a scleral fixed IOL in this time period.  - Today now presents with spontaneous worsening of his vision without acute trauma.  -On examination, found to have intraocular lens resting in the inferonasal aspect of his left eye. No retinal detachment, heme, holes or tears identified on examination.  Scleral buckle is present, along with laser scar.   - Patient will visit Dr. Thompson for follow-up visit this week and planning for surgical intervention for this.   - Discussed that if symptoms significantly worsens with visual changes, flashes of lights, floaters, redness, irritation, discharge or any other changes to the eye - he should call right away.     It is our pleasure to participate in this patient's care and treatment. Please contact us with any further questions or concerns.    Discussed with Dr. Stefan Steven who agreed with this assessment and plan.     Christin Atkinson MD  Resident Physician, PGY2   Department of Ophthalmology  Pager: [phone]    +     James Prince MD - PGY3  Department of Ophthalmology  Pager: [phone]    ATTESTATION     Attending Physician Attestation:     I have not examined this patient. I have discussed the case and the management of this patient's care with the Resident/Fellow. I also have reviewed and agree with the assessment and plan as stated above and agree with all of its relevant components.    Stefan Steven MD  Retina Fellow  Department of Ophthalmology  [facility]  Pager: [phone]

## 2021-09-07 ENCOUNTER — TELEPHONE (OUTPATIENT)
Dept: OPHTHALMOLOGY | Facility: CLINIC | Age: 61
End: 2021-09-07

## 2021-09-07 NOTE — TELEPHONE ENCOUNTER
Scheduled with Dr. Thompson next clinic day on Friday 9- at 0850 at B location    Pt aware of date/time/location    Note to facilitator who also received message on scheduling pt to review/amend if applicable    Oleg Andrade RN 3:44 PM 09/07/21

## 2021-09-07 NOTE — TELEPHONE ENCOUNTER
[facility] Call Center    Phone Message    May a detailed message be left on voicemail: yes     Reason for Call: Other: Pt's wife Megan called, states Pt was seen yesterday and they were told they would receive a call today to schedule an appt. w/Dr. Thompson for a follow-up/surgical intervention planning. Please call Megan back ASAP to schedule. Thank you.     Action Taken: Message routed to:  [facility]    Travel Screening: Not Applicable

## 2021-09-10 ENCOUNTER — OFFICE VISIT (OUTPATIENT)
Dept: OPHTHALMOLOGY | Facility: CLINIC | Age: 61
End: 2021-09-10
Attending: OPHTHALMOLOGY
Payer: OTHER MISCELLANEOUS

## 2021-09-10 DIAGNOSIS — T85.22XA DISLOCATED IOL (INTRAOCULAR LENS), POSTERIOR, LEFT: Primary | ICD-10-CM

## 2021-09-10 DIAGNOSIS — Z98.890 HISTORY OF RUPTURED GLOBE REPAIR: ICD-10-CM

## 2021-09-10 DIAGNOSIS — T85.22XA DISLOCATED IOL (INTRAOCULAR LENS), POSTERIOR, LEFT: ICD-10-CM

## 2021-09-10 PROCEDURE — 99214 OFFICE O/P EST MOD 30 MIN: CPT | Mod: 25 | Performed by: OPHTHALMOLOGY

## 2021-09-10 PROCEDURE — G0463 HOSPITAL OUTPT CLINIC VISIT: HCPCS

## 2021-09-10 PROCEDURE — 92134 CPTRZ OPH DX IMG PST SGM RTA: CPT | Performed by: OPHTHALMOLOGY

## 2021-09-10 PROCEDURE — 99213 OFFICE O/P EST LOW 20 MIN: CPT | Mod: 25 | Performed by: OPHTHALMOLOGY

## 2021-09-10 ASSESSMENT — VISUAL ACUITY
METHOD: SNELLEN - LINEAR
CORRECTION_TYPE: GLASSES
OS_PH_CC: 20/125
OD_CC: 20/15
METHOD: SNELLEN - LINEAR
OS_PH_CC: 20/125
OS_CC: 3/200 E
OS_CC: 3/200 E
OD_CC: 20/15
CORRECTION_TYPE: GLASSES

## 2021-09-10 ASSESSMENT — REFRACTION_WEARINGRX
OD_AXIS: 008
OS_CYLINDER: +1.75
OD_ADD: +2.50
OS_CYLINDER: +1.75
OD_AXIS: 008
OS_ADD: +2.50
OD_SPHERE: +0.50
OS_ADD: +2.50
OD_CYLINDER: +0.50
OD_CYLINDER: +0.50
OD_ADD: +2.50
OS_SPHERE: -2.00
OS_AXIS: 055
OS_AXIS: 055
OD_SPHERE: +0.50
OS_SPHERE: -2.00

## 2021-09-10 ASSESSMENT — CONF VISUAL FIELD
OD_NORMAL: 1
METHOD: COUNTING FINGERS
OS_SUPERIOR_NASAL_RESTRICTION: 3
OS_SUPERIOR_TEMPORAL_RESTRICTION: 3
OS_INFERIOR_TEMPORAL_RESTRICTION: 3
OS_SUPERIOR_NASAL_RESTRICTION: 3
OS_INFERIOR_NASAL_RESTRICTION: 3
OS_INFERIOR_TEMPORAL_RESTRICTION: 3
OS_SUPERIOR_TEMPORAL_RESTRICTION: 3
OS_INFERIOR_NASAL_RESTRICTION: 3

## 2021-09-10 ASSESSMENT — CUP TO DISC RATIO
OS_RATIO: 0.2
OS_RATIO: 0.2

## 2021-09-10 ASSESSMENT — SLIT LAMP EXAM - LIDS
COMMENTS: NORMAL

## 2021-09-10 ASSESSMENT — EXTERNAL EXAM - LEFT EYE
OS_EXAM: NORMAL
OS_EXAM: NORMAL

## 2021-09-10 ASSESSMENT — TONOMETRY
OS_IOP_MMHG: 15
IOP_METHOD: TONOPEN
OS_IOP_MMHG: 15
OD_IOP_MMHG: 13
OD_IOP_MMHG: 13
IOP_METHOD: TONOPEN

## 2021-09-10 ASSESSMENT — EXTERNAL EXAM - RIGHT EYE
OD_EXAM: NORMAL
OD_EXAM: NORMAL

## 2021-09-10 NOTE — PROGRESS NOTES
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  He is referred today from Dr. Thompson for a left IOL dislocation and concern for a narrow angle on the right eye.      POHx:  Last eye exam: 9/10/21    Prior eye surgery/laser:   OGR OS 2008  PPV/SBP OS  2008 (JT)  Sutured 18.0 D CZ70BD  OS  2008 (Awan)    CTL wearer: no  Glasses: yes    Family Hx of eye disease: no    Gtts Currenly Taking:  none    Allergies:  Cefprozil (hives)  PCN (Rash)  Hydrocodone (don't remember)    A/P:    # Dislocated IOL left eye: appears to still be attached in the nasal aspect  - R/B/P discussed with pt today.  - Sutured IOl w/ Replacement with using gortext sutures instead of prolene. (discussed difference with patient today) vs Removal of lens (leave pt Aphakic, and dis. Large incision with longer healing q3mo; restrict x1-2wks) WITH possible Iridodialysis   - Pt would like to proceed with COMBO surgery with Keven.and with STEVAN for Replacement of lens w/ Iridoplasty w/ Mac Block, 60 mins.    # Traumatic mydriasis and corectopia left eye     # No angle closure or narrow angle right eye  - Observe for now.      PLAN:  Plan for COMBO w/ Darryloz.      Follow up Cornea:  POD1 V,T at next visit, call if sx worsen to clinic.    ALSO SEES:  Dr. Keven Jones, DO  Fellow, Cornea & External Disease  Department of Ophthalmology  [facility]    Attending Physician Attestation:  Complete documentation of historical and exam elements from today's encounter can be found in the full encounter summary report (not reduplicated in this progress note).  I personally obtained the chief complaint(s) and history of present illness.  I confirmed and edited as necessary the review of  systems, past medical/surgical history, family history, social history, and examination findings as documented by others; and I examined the patient myself.  I personally reviewed the relevant tests, images, and reports as documented above.  I formulated and edited as necessary the assessment and plan and discussed the findings and management plan with the patient and family. - Faustino Williamson MD    I personally spent great than 40min with the patient, of which >50% of the time was spent face to face with the patient, counseling and coordinating care with the patient. We discussed the complexity of his diagnosis, the need for further information prior to proceeding with yet another surgery, and the unknown prognosis for the patient at this time. Coordinated care with Dr. Thompson.    Faustino Williamson MD

## 2021-09-10 NOTE — LETTER
9/10/2021       RE: Joce Maier  [address]  [place] [address]     Dear Colleague,    Thank you for referring your patient, Joce Maier, to the [facility] at [facility]. Please see a copy of my visit note below.    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  He is referred today from Dr. Thompson for a left IOL dislocation and concern for a narrow angle on the right eye.      POHx:  Last eye exam: 9/10/21    Prior eye surgery/laser:   OGR OS 2008  PPV/SBP OS  2008 (JT)  Sutured 18.0 D CZ70BD  OS  2008 (Awan)    CTL wearer: no  Glasses: yes    Family Hx of eye disease: no    Gtts Currenly Taking:  none    Allergies:  Cefprozil (hives)  PCN (Rash)  Hydrocodone (don't remember)    A/P:    # Dislocated IOL left eye: appears to still be attached in the nasal aspect  - R/B/P discussed with pt today.  - Sutured IOl w/ Replacement with using gortext sutures instead of prolene. (discussed difference with patient today) vs Removal of lens (leave pt Aphakic, and dis. Large incision with longer healing q3mo; restrict x1-2wks) WITH possible Iridodialysis   - Pt would like to proceed with COMBO surgery with Keven.and with STEVAN for Replacement of lens w/ Iridoplasty w/ Mac Block, 60 mins.    # Traumatic mydriasis and corectopia left eye     # No angle closure or narrow angle right eye  - Observe for now.      PLAN:  Plan for COMBO w/ Keven.      Follow up Cornea:  POD1 V,T at next visit, call if sx worsen to clinic.    ALSO SEES:  Dr. Baca      Again, thank you for allowing me to participate in the care of your patient.      Sincerely,    Faustino Williamson MD

## 2021-09-10 NOTE — NURSING NOTE
Chief Complaints and History of Present Illnesses   Patient presents with     Consult For     Chief Complaint(s) and History of Present Illness(es)     Consult For     Laterality: left eye    Onset: unknown              Comments     New Pt consult for dislocated IOL LE.    Pt states vision is about the same as 4 days ago. No eye pain today.  No flashes or floaters. Occasional tearing and dryness in LE that comes and goes.  Pt disoriented with LE being blurry. Pt patches LE when driving.    ASHER Montez September 10, 2021 8:30 AM

## 2021-09-10 NOTE — PROGRESS NOTES
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      PAST OCULAR SURGERY  OGR OS 2008  PPV/SBP OS  2008 (JT)  Sutured 18.0 D CZ70BD  OS  2008 (Emperatriz)    RETINAL MAGING  OCT 9-10-21  OD- normal retina, PHF attached  OS- tr ERM, mild outer retina irregularity nasally; stable      ASSESSMENT & PLAN    # Dislocated IOL OS    - CZ70BD lens with broken temporal suture      - plan for exchange with Teri next few weeks   - r/b/a d/w patient: vision loss, blindness, infection, bleeding   - retinal detachment, need for more surgeries, need for gas or oil bubble and bubble restrictions   -  diplopia, refractive change   - persistent blurriness, distortion, or scotoma   - participation of fellow or resident      #.  H/o OGR left eye 2008   - s/p AC  reconstrcuction and sutured lens (Awan)   - s/p Pars plana vitrectomy (PPV)/SBP (JT)   - looks good today   - retina flat    #.  vitreous syneresis right eye   - advised S/Sx Retinal detachment 6/2021    #. Tr NS OD   - observe      #  Tr ERM OS   - not visually significant,    #. H/o Hollenhorst plaque and peripheral IRH OS   - noted 7/2017 IT arcade   - had w/u by PCP normal 2017 (carotid & TTE)      # narrow angle OD   - may need PI    - refer for eval        return to clinic:  For post-op    Kody Castorena MD  Vitreoretinal Surgery Fellow  [facility]     ATTESTATION     Attending Physician Attestation:      Complete documentation of historical and exam elements from today's encounter can be found in the full encounter summary report (not reduplicated in this progress note).  I personally obtained the chief complaint(s) and history of present illness.  I confirmed and edited as necessary the review of  systems, past medical/surgical history, family history, social history, and examination findings as documented by others; and I examined the patient myself.  I personally reviewed the relevant tests, images, and reports as documented above.  I formulated and edited as necessary the assessment and plan and discussed the findings and management plan with the patient and family    Katie Thompson MD, PhD  , Vitreoretinal Surgery  Department of Ophthalmology  [facility]

## 2021-09-10 NOTE — Clinical Note
9/10/2021       RE: Joce Maier  [address]  [place] [address]     Dear Colleague,    Thank you for referring your patient, Joce Maier, to the [facility] EYE CLINIC at [facility]. Please see a copy of my visit note below.    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  He is referred today from Dr. Thompson for a left IOL dislocation and concern for a narrow angle on the right eye.      POHx:  Last eye exam: 9/10/21    Prior eye surgery/laser:   OGR OS 2008  PPV/SBP OS  2008 (JT)  Sutured 18.0 D CZ70BD  OS  2008 (Awan)    CTL wearer: no  Glasses: yes    Family Hx of eye disease: no    Gtts Currenly Taking:  none    Allergies:  Cefprozil (hives)  PCN (Rash)  Hydrocodone (don't remember)    A/P:    # Dislocated IOL left eye: appears to still be attached in the nasal aspect  - R/B/P discussed with pt today.  - Sutured IOl w/ Replacement with using gortext sutures instead of prolene. (discussed difference with patient today) vs Removal of lens (leave pt Aphakic, and dis. Large incision with longer healing q3mo; restrict x1-2wks) WITH possible Iridodialysis   - Pt would like to proceed with COMBO surgery with Keven.and with STEVAN for Replacement of lens w/ Iridoplasty w/ Mac Block, 60 mins.    # Traumatic mydriasis and corectopia left eye     # No angle closure or narrow angle right eye  - Observe for now.      PLAN:  Plan for COMBO w/ Keven.      Follow up Cornea:  POD1 V,T at next visit, call if sx worsen to clinic.      ALSO SEES:  Dr. Keven Jones, DO  Fellow, Cornea & External Disease  Department of Ophthalmology  [facility]          Again, thank you for allowing  me to participate in the care of your patient.      Sincerely,    Faustino Williamson MD

## 2021-09-10 NOTE — NURSING NOTE
Chief Complaints and History of Present Illnesses   Patient presents with     Retinal Evaluation     Dislocated IOL LE     Chief Complaint(s) and History of Present Illness(es)     Retinal Evaluation     Comments: Dislocated IOL LE              Comments     Pt states vision is about the same as 4 days ago. No eye pain today.  No flashes or floaters. Occasional tearing and dryness in LE that comes and goes.  Pt disoriented with LE being blurry. Pt patches LE when driving.    ASHER Montez September 10, 2021 8:30 AM

## 2021-09-17 ENCOUNTER — TELEPHONE (OUTPATIENT)
Dept: OPHTHALMOLOGY | Facility: CLINIC | Age: 61
End: 2021-09-17

## 2021-09-17 PROBLEM — T85.22XA DISLOCATED IOL (INTRAOCULAR LENS), POSTERIOR, LEFT: Status: ACTIVE | Noted: 2021-09-17

## 2021-09-17 NOTE — TELEPHONE ENCOUNTER
Megan called to find out the scheduling time frame. I pulled up Joce's case request with Dr. Williamson and it looks to be a combo case with Dr. Thompson. I explained that these providers have different surgical days and I will need to find a time/date that works with both providers schedules. I let her know I would call back next week to touch base or schedule if I have dates agreed upon.

## 2021-09-27 ENCOUNTER — TELEPHONE (OUTPATIENT)
Dept: OPHTHALMOLOGY | Facility: CLINIC | Age: 61
End: 2021-09-27

## 2021-09-27 ENCOUNTER — HOSPITAL ENCOUNTER (OUTPATIENT)
Facility: AMBULATORY SURGERY CENTER | Age: 61
End: 2021-09-27
Attending: OPHTHALMOLOGY
Payer: OTHER MISCELLANEOUS

## 2021-09-27 NOTE — TELEPHONE ENCOUNTER
[facility] Call Center    Phone Message    May a detailed message be left on voicemail: yes     Reason for Call: Other: Pt's wife, Marcia, would like to speak w/someone regarding getting the Pt's surgery scheduled. States she's left messages w/surgery scheduler and no response yet. Would also like to know if anyone had turned in a pair of glasses and clip-on sunglasses at the clinic. Please call back. Thank you.     Action Taken: Message routed to:  [facility] ([facility]): [facility]    Travel Screening: Not Applicable

## 2021-09-27 NOTE — TELEPHONE ENCOUNTER
I spoke to the aniyah's wife and told her the surgery scheduler will be reaching out to her.  She is also looking for [employer] eye glasses with a clip on lost on [place] at the [facility].  I will check with the [facility] team.

## 2021-09-27 NOTE — TELEPHONE ENCOUNTER
I spoke with Marcia to let her know I have some OR time saved and I am just waiting on the approval of both providers for 10/07. I let her know once I have the okay I will call back to finish scheduling.

## 2021-09-30 DIAGNOSIS — Z11.59 ENCOUNTER FOR SCREENING FOR OTHER VIRAL DISEASES: Primary | ICD-10-CM

## 2021-10-08 ENCOUNTER — HOSPITAL ENCOUNTER (OUTPATIENT)
Facility: CLINIC | Age: 61
Discharge: HOME | End: 2021-10-08
Attending: OPHTHALMOLOGY | Admitting: OPHTHALMOLOGY
Payer: OTHER MISCELLANEOUS

## 2021-10-08 ENCOUNTER — ANESTHESIA EVENT (OUTPATIENT)
Dept: SURGERY | Facility: CLINIC | Age: 61
End: 2021-10-08
Payer: OTHER MISCELLANEOUS

## 2021-10-08 ENCOUNTER — ANESTHESIA (OUTPATIENT)
Dept: SURGERY | Facility: CLINIC | Age: 61
End: 2021-10-08
Payer: OTHER MISCELLANEOUS

## 2021-10-08 VITALS
HEIGHT: 68 IN | BODY MASS INDEX: 30.47 KG/M2 | TEMPERATURE: 98.4 F | WEIGHT: 201.06 LBS | HEART RATE: 72 BPM | SYSTOLIC BLOOD PRESSURE: 132 MMHG | DIASTOLIC BLOOD PRESSURE: 96 MMHG | RESPIRATION RATE: 18 BRPM | OXYGEN SATURATION: 98 %

## 2021-10-08 DIAGNOSIS — Z48.810 AFTERCARE FOLLOWING SURGERY OF A SENSE ORGAN: Primary | ICD-10-CM

## 2021-10-08 DIAGNOSIS — T85.22XA DISLOCATED IOL (INTRAOCULAR LENS), POSTERIOR, LEFT: ICD-10-CM

## 2021-10-08 LAB — GLUCOSE BLDC GLUCOMTR-MCNC: 116 MG/DL (ref 70–99)

## 2021-10-08 PROCEDURE — 258N000003 HC RX IP 258 OP 636: Performed by: ANESTHESIOLOGY

## 2021-10-08 PROCEDURE — 250N000009 HC RX 250: Performed by: OPHTHALMOLOGY

## 2021-10-08 PROCEDURE — 710N000012 HC RECOVERY PHASE 2, PER MINUTE: Performed by: OPHTHALMOLOGY

## 2021-10-08 PROCEDURE — 250N000009 HC RX 250: Performed by: STUDENT IN AN ORGANIZED HEALTH CARE EDUCATION/TRAINING PROGRAM

## 2021-10-08 PROCEDURE — 66985 INSERT LENS PROSTHESIS: CPT | Mod: LT | Performed by: OPHTHALMOLOGY

## 2021-10-08 PROCEDURE — 360N000077 HC SURGERY LEVEL 4, PER MIN: Performed by: OPHTHALMOLOGY

## 2021-10-08 PROCEDURE — 250N000011 HC RX IP 250 OP 636: Performed by: OPHTHALMOLOGY

## 2021-10-08 PROCEDURE — 250N000011 HC RX IP 250 OP 636: Performed by: REGISTERED NURSE

## 2021-10-08 PROCEDURE — 65920 REMOVE IMPLANT OF EYE: CPT | Mod: XP | Performed by: OPHTHALMOLOGY

## 2021-10-08 PROCEDURE — 66682 REPAIR IRIS & CILIARY BODY: CPT | Mod: LT | Performed by: OPHTHALMOLOGY

## 2021-10-08 PROCEDURE — 250N000009 HC RX 250: Performed by: REGISTERED NURSE

## 2021-10-08 PROCEDURE — V2632 POST CHMBR INTRAOCULAR LENS: HCPCS | Performed by: OPHTHALMOLOGY

## 2021-10-08 PROCEDURE — 370N000017 HC ANESTHESIA TECHNICAL FEE, PER MIN: Performed by: OPHTHALMOLOGY

## 2021-10-08 PROCEDURE — 272N000001 HC OR GENERAL SUPPLY STERILE: Performed by: OPHTHALMOLOGY

## 2021-10-08 PROCEDURE — 999N000141 HC STATISTIC PRE-PROCEDURE NURSING ASSESSMENT: Performed by: OPHTHALMOLOGY

## 2021-10-08 PROCEDURE — 82962 GLUCOSE BLOOD TEST: CPT

## 2021-10-08 RX ORDER — CYCLOPENTOLAT/TROPIC/PHENYLEPH 1%-1%-2.5%
1 DROPS (EA) OPHTHALMIC (EYE)
Status: COMPLETED | OUTPATIENT
Start: 2021-10-08 | End: 2021-10-08

## 2021-10-08 RX ORDER — SODIUM CHLORIDE, SODIUM LACTATE, POTASSIUM CHLORIDE, CALCIUM CHLORIDE 600; 310; 30; 20 MG/100ML; MG/100ML; MG/100ML; MG/100ML
INJECTION, SOLUTION INTRAVENOUS CONTINUOUS
Status: DISCONTINUED | OUTPATIENT
Start: 2021-10-08 | End: 2021-10-09 | Stop reason: HOSPADM

## 2021-10-08 RX ORDER — ROSUVASTATIN CALCIUM 10 MG/1
10 TABLET, COATED ORAL DAILY
COMMUNITY

## 2021-10-08 RX ORDER — FENTANYL CITRATE 50 UG/ML
25 INJECTION, SOLUTION INTRAMUSCULAR; INTRAVENOUS
Status: DISCONTINUED | OUTPATIENT
Start: 2021-10-08 | End: 2021-10-09 | Stop reason: HOSPADM

## 2021-10-08 RX ORDER — MEPERIDINE HYDROCHLORIDE 25 MG/ML
12.5 INJECTION INTRAMUSCULAR; INTRAVENOUS; SUBCUTANEOUS
Status: DISCONTINUED | OUTPATIENT
Start: 2021-10-08 | End: 2021-10-09 | Stop reason: HOSPADM

## 2021-10-08 RX ORDER — OXYCODONE HYDROCHLORIDE 5 MG/1
5 TABLET ORAL EVERY 4 HOURS PRN
Status: DISCONTINUED | OUTPATIENT
Start: 2021-10-08 | End: 2021-10-09 | Stop reason: HOSPADM

## 2021-10-08 RX ORDER — OFLOXACIN 3 MG/ML
SOLUTION/ DROPS OPHTHALMIC PRN
Status: DISCONTINUED | OUTPATIENT
Start: 2021-10-08 | End: 2021-10-08 | Stop reason: HOSPADM

## 2021-10-08 RX ORDER — PREDNISOLONE ACETATE 1 %
SUSPENSION, DROPS(FINAL DOSAGE FORM)(ML) OPHTHALMIC (EYE) PRN
Status: DISCONTINUED | OUTPATIENT
Start: 2021-10-08 | End: 2021-10-08 | Stop reason: HOSPADM

## 2021-10-08 RX ORDER — FAMOTIDINE 20 MG
2000 TABLET ORAL
COMMUNITY

## 2021-10-08 RX ORDER — NALOXONE HYDROCHLORIDE 0.4 MG/ML
0.4 INJECTION, SOLUTION INTRAMUSCULAR; INTRAVENOUS; SUBCUTANEOUS
Status: DISCONTINUED | OUTPATIENT
Start: 2021-10-08 | End: 2021-10-09 | Stop reason: HOSPADM

## 2021-10-08 RX ORDER — FENTANYL CITRATE 50 UG/ML
25 INJECTION, SOLUTION INTRAMUSCULAR; INTRAVENOUS EVERY 5 MIN PRN
Status: DISCONTINUED | OUTPATIENT
Start: 2021-10-08 | End: 2021-10-09 | Stop reason: HOSPADM

## 2021-10-08 RX ORDER — ACETAMINOPHEN 325 MG/1
975 TABLET ORAL ONCE
Status: DISCONTINUED | OUTPATIENT
Start: 2021-10-08 | End: 2021-10-09 | Stop reason: HOSPADM

## 2021-10-08 RX ORDER — PROPOFOL 10 MG/ML
INJECTION, EMULSION INTRAVENOUS PRN
Status: DISCONTINUED | OUTPATIENT
Start: 2021-10-08 | End: 2021-10-08

## 2021-10-08 RX ORDER — LIDOCAINE HYDROCHLORIDE 20 MG/ML
INJECTION, SOLUTION INFILTRATION; PERINEURAL PRN
Status: DISCONTINUED | OUTPATIENT
Start: 2021-10-08 | End: 2021-10-08

## 2021-10-08 RX ORDER — MULTIVIT-MIN/IRON/FOLIC ACID/K 18-600-40
CAPSULE ORAL
COMMUNITY

## 2021-10-08 RX ORDER — BALANCED SALT SOLUTION 6.4; .75; .48; .3; 3.9; 1.7 MG/ML; MG/ML; MG/ML; MG/ML; MG/ML; MG/ML
SOLUTION OPHTHALMIC PRN
Status: DISCONTINUED | OUTPATIENT
Start: 2021-10-08 | End: 2021-10-08 | Stop reason: HOSPADM

## 2021-10-08 RX ORDER — SOFT LENS RINSE,STORE SOLUTION
SOLUTION, NON-ORAL MISCELLANEOUS PRN
Status: DISCONTINUED | OUTPATIENT
Start: 2021-10-08 | End: 2021-10-08 | Stop reason: HOSPADM

## 2021-10-08 RX ORDER — ONDANSETRON 2 MG/ML
4 INJECTION INTRAMUSCULAR; INTRAVENOUS EVERY 30 MIN PRN
Status: DISCONTINUED | OUTPATIENT
Start: 2021-10-08 | End: 2021-10-09 | Stop reason: HOSPADM

## 2021-10-08 RX ORDER — PROPARACAINE HYDROCHLORIDE 5 MG/ML
1 SOLUTION/ DROPS OPHTHALMIC ONCE
Status: COMPLETED | OUTPATIENT
Start: 2021-10-08 | End: 2021-10-08

## 2021-10-08 RX ORDER — OFLOXACIN 3 MG/ML
1 SOLUTION/ DROPS OPHTHALMIC 4 TIMES DAILY
Qty: 5 ML | Refills: 0 | Status: SHIPPED | OUTPATIENT
Start: 2021-10-08 | End: 2021-10-22

## 2021-10-08 RX ORDER — HYDROMORPHONE HYDROCHLORIDE 1 MG/ML
0.2 INJECTION, SOLUTION INTRAMUSCULAR; INTRAVENOUS; SUBCUTANEOUS EVERY 5 MIN PRN
Status: DISCONTINUED | OUTPATIENT
Start: 2021-10-08 | End: 2021-10-09 | Stop reason: HOSPADM

## 2021-10-08 RX ORDER — PREDNISOLONE ACETATE 10 MG/ML
1 SUSPENSION/ DROPS OPHTHALMIC 4 TIMES DAILY
Qty: 5 ML | Refills: 0 | Status: SHIPPED | OUTPATIENT
Start: 2021-10-08 | End: 2021-10-13

## 2021-10-08 RX ORDER — NALOXONE HYDROCHLORIDE 0.4 MG/ML
0.2 INJECTION, SOLUTION INTRAMUSCULAR; INTRAVENOUS; SUBCUTANEOUS
Status: DISCONTINUED | OUTPATIENT
Start: 2021-10-08 | End: 2021-10-09 | Stop reason: HOSPADM

## 2021-10-08 RX ORDER — SODIUM CHLORIDE, SODIUM LACTATE, POTASSIUM CHLORIDE, CALCIUM CHLORIDE 600; 310; 30; 20 MG/100ML; MG/100ML; MG/100ML; MG/100ML
500 INJECTION, SOLUTION INTRAVENOUS CONTINUOUS
Status: DISCONTINUED | OUTPATIENT
Start: 2021-10-08 | End: 2021-10-08 | Stop reason: HOSPADM

## 2021-10-08 RX ORDER — TETRACAINE HYDROCHLORIDE 5 MG/ML
SOLUTION OPHTHALMIC PRN
Status: DISCONTINUED | OUTPATIENT
Start: 2021-10-08 | End: 2021-10-08 | Stop reason: HOSPADM

## 2021-10-08 RX ORDER — ONDANSETRON 4 MG/1
4 TABLET, ORALLY DISINTEGRATING ORAL EVERY 30 MIN PRN
Status: DISCONTINUED | OUTPATIENT
Start: 2021-10-08 | End: 2021-10-09 | Stop reason: HOSPADM

## 2021-10-08 RX ADMIN — PROPOFOL 30 MG: 10 INJECTION, EMULSION INTRAVENOUS at 17:44

## 2021-10-08 RX ADMIN — PROPOFOL 20 MG: 10 INJECTION, EMULSION INTRAVENOUS at 20:34

## 2021-10-08 RX ADMIN — Medication 1 DROP: at 13:53

## 2021-10-08 RX ADMIN — PROPOFOL 30 MG: 10 INJECTION, EMULSION INTRAVENOUS at 20:31

## 2021-10-08 RX ADMIN — Medication 1 DROP: at 13:38

## 2021-10-08 RX ADMIN — PROPOFOL 20 MG: 10 INJECTION, EMULSION INTRAVENOUS at 20:04

## 2021-10-08 RX ADMIN — PROPARACAINE HYDROCHLORIDE 1 DROP: 5 SOLUTION/ DROPS OPHTHALMIC at 13:31

## 2021-10-08 RX ADMIN — PROPOFOL 50 MG: 10 INJECTION, EMULSION INTRAVENOUS at 17:42

## 2021-10-08 RX ADMIN — MIDAZOLAM 1 MG: 1 INJECTION INTRAMUSCULAR; INTRAVENOUS at 19:07

## 2021-10-08 RX ADMIN — SODIUM CHLORIDE, SODIUM LACTATE, POTASSIUM CHLORIDE, CALCIUM CHLORIDE: 600; 310; 30; 20 INJECTION, SOLUTION INTRAVENOUS at 17:33

## 2021-10-08 RX ADMIN — MIDAZOLAM 2 MG: 1 INJECTION INTRAMUSCULAR; INTRAVENOUS at 18:05

## 2021-10-08 RX ADMIN — LIDOCAINE HYDROCHLORIDE 60 MG: 20 INJECTION, SOLUTION INFILTRATION; PERINEURAL at 17:40

## 2021-10-08 RX ADMIN — MIDAZOLAM 1 MG: 1 INJECTION INTRAMUSCULAR; INTRAVENOUS at 18:34

## 2021-10-08 RX ADMIN — PROPOFOL 20 MG: 10 INJECTION, EMULSION INTRAVENOUS at 20:02

## 2021-10-08 RX ADMIN — PROPOFOL 30 MG: 10 INJECTION, EMULSION INTRAVENOUS at 20:10

## 2021-10-08 RX ADMIN — Medication 1 DROP: at 13:44

## 2021-10-08 RX ADMIN — MIDAZOLAM 2 MG: 1 INJECTION INTRAMUSCULAR; INTRAVENOUS at 17:38

## 2021-10-08 RX ADMIN — MIDAZOLAM 1 MG: 1 INJECTION INTRAMUSCULAR; INTRAVENOUS at 19:00

## 2021-10-08 ASSESSMENT — ENCOUNTER SYMPTOMS
SEIZURES: 0
DYSRHYTHMIAS: 0

## 2021-10-08 ASSESSMENT — COPD QUESTIONNAIRES: COPD: 0

## 2021-10-08 ASSESSMENT — MIFFLIN-ST. JEOR: SCORE: 1701.5

## 2021-10-08 NOTE — ANESTHESIA PREPROCEDURE EVALUATION
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       Past Surgical History:   Procedure Laterality Date     CATARACT IOL, RT/LT Left 08    Iris sutured lens with iridoplasty      REPAIR RUPTURED GLOBE Left 3/12/08     VITRECTOMY PARSPLANA Left 3/26/08    PPV, PPL, gas LE      Allergies   Allergen Reactions     Cefprozil Hives     Hydrocodone Unknown     Penicillins Unknown and Rash      Social History     Tobacco Use     Smoking status: Never Smoker     Smokeless tobacco: Current User   Substance Use Topics     Alcohol use: Yes      Wt Readings from Last 1 Encounters:   10/08/21 91.2 kg (201 lb 1 oz)        Anesthesia Evaluation   Pt has had prior anesthetic. Type: General.    No history of anesthetic complications       ROS/MED HX  ENT/Pulmonary:    (-) asthma, COPD and sleep apnea   Neurologic:    (-) no seizures, no CVA and migraines   Cardiovascular:     (+) Dyslipidemia ----- (-) hypertension, taking anticoagulants/antiplatelets, arrhythmias, irregular heartbeat/palpitations, PVD and murmur   METS/Exercise Tolerance: >4 METS    Hematologic:    (-) anemia   Musculoskeletal:    (-) arthritis   GI/Hepatic:     (+) GERD, Asymptomatic on medication,  (-) liver disease   Renal/Genitourinary:    (-) renal disease   Endo:     (+) Obesity,  (-) Type I DM and Type II DM   Psychiatric/Substance Use:    (-) psychiatric history and alcohol abuse history   Infectious Disease:     (-) Recent Fever   Malignancy:       Other:      (+) , no H/O Chronic Pain,        Physical Exam    Airway        Mallampati: II   TM distance: > 3 FB   Neck ROM: full   Mouth opening: > 3 cm    Respiratory Devices and Support         Dental       (+) caps      Cardiovascular          Rhythm and rate: regular and normal (-) no murmur    Pulmonary   pulmonary exam normal        breath sounds clear to auscultation           OUTSIDE LABS:  CBC:   Lab Results   Component Value Date    WBC 8.0 03/13/2008    WBC 6.9 03/12/2008    HGB 13.5 03/13/2008    HGB 13.9 03/12/2008    HCT 39.6 (L) 03/13/2008    HCT 39.2 (L) 03/12/2008     03/13/2008     03/12/2008     BMP:   Lab Results   Component Value Date     03/13/2008     03/12/2008    POTASSIUM 4.5 03/13/2008    POTASSIUM 3.9 03/12/2008    CHLORIDE 109 03/13/2008    CHLORIDE 102 03/12/2008    CO2 24 03/13/2008    CO2 29 03/12/2008    BUN 12 03/13/2008    BUN 18 03/12/2008    CR 1.37 03/13/2008    CR 1.26 03/12/2008     (H) 10/08/2021     (H) 03/13/2008     COAGS: No results found for: PTT, INR, FIBR  POC: No results found for: BGM, HCG, HCGS  HEPATIC:   Lab Results   Component Value Date    ALBUMIN 4.1 03/12/2008    PROTTOTAL 7.3 03/12/2008    ALT 32 03/12/2008    AST 29 03/12/2008    ALKPHOS 66 03/12/2008    BILITOTAL 0.4 03/12/2008     OTHER:   Lab Results   Component Value Date    NICOLE 8.6 03/13/2008       Anesthesia Plan    ASA Status:  2   NPO Status:  NPO Appropriate    Anesthesia Type: MAC.     - Reason for MAC: immobility needed   Induction: Intravenous.   Maintenance: TIVA.        Consents    Anesthesia Plan(s) and associated risks, benefits, and realistic alternatives discussed. Questions answered and patient/representative(s) expressed understanding.     - Discussed with:  Patient      - Specific Concerns: PONV, conversion to general, post op pain.     - Extended Intubation/Ventilatory Support Discussed: No.      - Patient is  DNR/DNI Status: No    Use of blood products discussed: No .     Postoperative Care    Pain management: IV analgesics, Oral pain medications, Multi-modal analgesia.   PONV prophylaxis: Ondansetron (or other 5HT-3), Background Propofol Infusion     Comments:    History of multiple eye surgeries without complication, otherwise no significant health issues. Discussed plan for MAC anesthetic with block by surgeon.     Patient voiced understanding of plan and had no further questions.             Santiago Romero MD

## 2021-10-08 NOTE — OR NURSING
Pt. voiced frustration over lack of communication in scheduling surgery as well as communication about procedure.  Pt. Relations information provided to pt. And spouse.  
Additional Safety/Bands:

## 2021-10-09 ENCOUNTER — OFFICE VISIT (OUTPATIENT)
Dept: OPHTHALMOLOGY | Facility: CLINIC | Age: 61
End: 2021-10-09
Payer: OTHER MISCELLANEOUS

## 2021-10-09 ENCOUNTER — APPOINTMENT (OUTPATIENT)
Dept: OPHTHALMOLOGY | Facility: CLINIC | Age: 61
End: 2021-10-09
Payer: OTHER MISCELLANEOUS

## 2021-10-09 DIAGNOSIS — Z48.89 POSTOPERATIVE VISIT: Primary | ICD-10-CM

## 2021-10-09 DIAGNOSIS — Z96.1 PSEUDOPHAKIA OF RIGHT EYE: ICD-10-CM

## 2021-10-09 PROCEDURE — 99024 POSTOP FOLLOW-UP VISIT: CPT | Performed by: OPHTHALMOLOGY

## 2021-10-09 ASSESSMENT — VISUAL ACUITY
OS_PH_SC: 20/125
OS_SC: CF
OD_CC: 20/20
METHOD: SNELLEN - LINEAR

## 2021-10-09 ASSESSMENT — EXTERNAL EXAM - LEFT EYE: OS_EXAM: NORMAL

## 2021-10-09 ASSESSMENT — CONF VISUAL FIELD
OD_SUPERIOR_NASAL_RESTRICTION: 1
OS_SUPERIOR_TEMPORAL_RESTRICTION: 3
OD_INFERIOR_NASAL_RESTRICTION: 1
OD_INFERIOR_TEMPORAL_RESTRICTION: 1
OS_INFERIOR_TEMPORAL_RESTRICTION: 3
OD_SUPERIOR_TEMPORAL_RESTRICTION: 1

## 2021-10-09 ASSESSMENT — CUP TO DISC RATIO: OS_RATIO: 0.2

## 2021-10-09 ASSESSMENT — TONOMETRY
OD_IOP_MMHG: 13
IOP_METHOD: TONOPEN
OS_IOP_MMHG: 10

## 2021-10-09 ASSESSMENT — SLIT LAMP EXAM - LIDS: COMMENTS: NORMAL

## 2021-10-09 ASSESSMENT — EXTERNAL EXAM - RIGHT EYE: OD_EXAM: NORMAL

## 2021-10-09 NOTE — OP NOTE
PRE-OP Dx:     1) Dislocated IOL OS    Post-OP Dx: Same    Attending:  REJI Thompson MD  Fellow: BENITO Steven MD  Resident: BIRD Prince MD    Anesthesia: MAC+RB  Procedure:     1) Pars plana vitrectomy (PPV) 25g OS    2) Explantation of IOL    EBL:   scant  Specimens:  None  Complications: none    Findings:    1) dislocated IOL OS      Procedure Description:    Joce Maier is an AGE: 61 year old  year old patient with a history of dislocated sutured IOL OS.  After informed consent was obtained, the patient was brought into the OR where MAC+RB anesthesia was administered.  The eye was then prepped and draped in the usual fashion for ophthalmic surgery.     Marks were made on the sclera inferotemporally, superotemporally, and superonasally 3.5 mm posterior to the limbus. The 25g transscleral cannulas were inserted through the sclera using the trocars.  The infusion cannula was connected to the inferonasal cannula and directly visualized to verify it was in the correct location.   A limited vitrectomy was done in this previously vitrectomized eye.    The inferonasal intact prolene suture was found and the knot cut. The soft tip cannula was used to elevated the IOL and forceps were used to bring it into the AC and out the scleral tunnel wound which Dr. Williamson created.  The retina was inspected and no breaks or tears were found.  The sclerotomies were closed and were tight with 8-0 vicryl suture.    At this point Dr. Williamson placed the IOL with the Yamane technique.  See his note for details.      The patient left the OR with no complications.      I was present for the entire retina surgery.  Katie Thompson MD

## 2021-10-09 NOTE — BRIEF OP NOTE
[facility]    Brief Operative Note    Pre-operative diagnosis: Dislocated IOL (intraocular lens), posterior, left [T85.22XA]  Post-operative diagnosis Same as pre-operative diagnosis    Procedure: Procedure(s):  REPLACEMENT, INTRAOCULAR LENS IMPLANT  REPAIR, IRIS  Left eye: vitrectomy, intraocular lens removal  Surgeon: Surgeon(s) and Role:  Panel 1:     * Faustino Williamson MD - Primary     * Stefan Jones DO - Fellow - Assisting  Panel 2:     * Katie Thompson MD - Primary     * James Prince MD - Resident - Assisting     * Stefan Jones DO - Fellow - Assisting  Anesthesia: Combined MAC with Retrobulbar   Estimated Blood Loss: Minimal    Drains: None  Specimens: * No specimens in log *  Findings:   None.  Complications: None.  Implants: * No implants in log *

## 2021-10-09 NOTE — ANESTHESIA POSTPROCEDURE EVALUATION
Patient: Joce Maier    Procedure: Procedure(s):  SCLERAL FIXATION INTRAOCULAR LENS,YAMANE TECHNIQUE  PUPILLOPLASTY  Left eye: vitrectomy, intraocular lens removal       Diagnosis:Dislocated IOL (intraocular lens), posterior, left [T85.22XA]  Diagnosis Additional Information: No value filed.    Anesthesia Type:  MAC    Note:  Disposition: Outpatient   Postop Pain Control: Uneventful            Sign Out: Well controlled pain   PONV: No   Neuro/Psych: Uneventful            Sign Out: Acceptable/Baseline neuro status   Airway/Respiratory: Uneventful            Sign Out: Acceptable/Baseline resp. status   CV/Hemodynamics: Uneventful            Sign Out: Acceptable CV status; No obvious hypovolemia; No obvious fluid overload   Other NRE: NONE   DID A NON-ROUTINE EVENT OCCUR? No           Last vitals:  Vitals Value Taken Time   /96 10/08/21 2133   Temp     Pulse 72 10/08/21 2133   Resp     SpO2 94 % 10/08/21 2136   Vitals shown include unvalidated device data.    Electronically Signed By: Santiago Romero MD  October 8, 2021  9:40 PM

## 2021-10-09 NOTE — PROGRESS NOTES
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  He is referred today from Dr. Thompson for a left IOL dislocation and concern for a narrow angle on the right eye.      Interval history: POD#1 s/p lens explantation/scleral fixated IOL (Yamane)/pupilloplasty.  Pt notes some pain but slept well over night.  Notes vision seems \"pretty good\" left eye.  Notes some floaters left eye.      POHx:  Last eye exam: 9/10/21    Prior eye surgery/laser:   OGR OS 2008  PPV/SBP OS  2008 (JT)  Sutured 18.0 D [id number]  OS  2008 (Emperatriz)    CTL wearer: no  Glasses: yes    Family Hx of eye disease: no    Gtts Currenly Taking:  None    Allergies:  Cefprozil (hives)  PCN (Rash)  Hydrocodone (don't remember)    A/P:    # Dislocated IOL left eye: appears to still be attached in the nasal aspect  -s/p IOL explantation/scleral fixated IOL (Yamane)/pupilloplasty (9/8/21)  Doing well.  IOP is normal, Bon negative superior wound.  Mild corneal edema.  Lens is slightly nasally decentered but covers the visual axis well.   -Discussed return precautions, activity limitations, and drop regimen    # Traumatic mydriasis and corectopia left eye     # No angle closure or narrow angle right eye  - Observe for now.      PLAN:  -Oflox QID OS  -Pred acetate QID OS    Follow up Cornea:  POW1 V,T at next visit, call if sx worsen to clinic.    ALSO SEES:  Dr. Baca    Attending Physician Attestation:  Complete documentation of historical and exam elements from today's encounter can be found in the full encounter summary report (not reduplicated in this progress note).  I personally obtained the chief complaint(s) and history of present illness.  I confirmed and edited as necessary the review of " systems, past medical/surgical history, family history, social history, and examination findings as documented by others; and I examined the patient myself.  I formulated  the assessment and plan and discussed the findings and management plan with the patient and family.     Stefan Jones, DO  Cornea Fellow

## 2021-10-09 NOTE — OP NOTE
Date of Operation: October 8, 2021     Pre-operative diagnosis:   Dislocated IOL (intraocular lens), posterior, left [T85.22XA]    Post-operative diagnosis: Same     Procedure(s): Right eye  1. Pars plana vitrectomy  2. Intraocular lens explantation  3. Scleral-fixated intraocular lens (Yamane technique)  4. Pupilloplasty     Attending: Faustino Williamson MD  Fellow: Stefan Jones DO     Findings: None   Blood Loss: None  Complications: None      Implant: [id number] 20.5 D, target -0.62 (Bryan), haptics at 3 and 9    Implant Name Type Inv. Item Serial No.  Lot No. LRB No. Used Action   EYE IMP IOL CRISTINA PCL TECNIS [id number] 20.5 - [id number] Lens/Eye Implant EYE IMP IOL CRISTINA PCL TECNIS [id number] 20.5 [id number] [employer]  Left 1 Implanted       DESCRIPTION OF PROCEDURE   In the preoperative suite, the patient was identified, the surgical site marked and informed consent was obtained. The patient was brought back to the operative suite where the appropriate anesthesia monitors were connected. With the assistance of the anesthesia team for sedation, a retrobulbar block was performed using a 50:50 mixture of 2% lidocaine and 0.75% Marcaine.  A routine time-out was performed and the patient's right eye was then prepped and draped using betadine in the usual sterile fashion for ophthalmic surgery.    Following draping, a lid speculum was placed to the operative eye. Using calipers, placement for the haptics of the scleral fixated lens were marked 1.75 mm posterior to the limbus. A 30g ultrathin walled needle was then passed through the sclera at the marked sites, parallel to the limbus and advanced clockwise through the scleral for a 1.5mm tunnel. This was repeated for the second sclerotomy trenton. Using a 2.8 mm keratome, a superior 6.5mm scleral tunnel was begun but the anterior chamber was not entered.  The case was then turned over to the retina team.  Once the intraocular lens was brought into the anterior  chamber, the scleral tunnel was advanced and the anterior chamber was entered.  The internal os of the tunnel was widened.  Healon was injected into anterior chamber.  The retina team successfully explanted the intraocular lens.      The cornea team then resumed care of the case.  The scleral tunnel was partially closed with two interrupted 10-0 nylon sutures.  Healon was then reinjected to help maintain the AC. The intraocular lens was then injected into the anterior chamber. This lens was found to be kinked and was explanted.  The lens was then injected into the anterior chamber.  The leading haptic was docked onto the nasal needle using intraocular forceps. The docked needle was then pushed into the eye up to the hub to allow the lens to assume a favorable conformation for the trailing haptic. The trailing haptic was then docked on the temporal needle. The needles were then retracted from the sclerotomies, externalizing the haptics. The haptic tips were then burned using a high-temp handheld cautery. The haptic bulbs were then rotated and buried into the sclerotomies tunnels.  Upon viewing the lens, there was significant tilt and so the lens was explanted.  The process was then repeated with scleral tunneled 30 gauge needles and another lens with improvement in the tilt.      The superior scleral tunnel was noted to leak slightly so additional 10-0 nylon sutures were placed to fully close the wound.      Attention was then turned to the atrophic irregular iris.  Using double armed 10-0 prolene, the iris remnants were approximated temporally using a Siepser slip knot technique.  Infusion had previously removed the Healon in the eye.  All 10-0 nylon sutures were rotated and buried.      The case was turned back over to the retina team to close the sclerotomies.      Dr. Faustino Williamson was scrubbed and present for the entire surgery.    Stefan Jones DO  Cornea & External Disease Fellow    Faustino Williamson MD  Assistant  Professor of Ophthalmology

## 2021-10-09 NOTE — DISCHARGE INSTRUCTIONS
Same-Day Surgery   Adult Discharge Orders & Instructions     For 24 hours after surgery:  1. Get plenty of rest.  A responsible adult must stay with you for at least 24 hours after you leave the hospital.   2. Pain medication can slow your reflexes. Do not drive or use heavy equipment.  If you have weakness or tingling, don't drive or use heavy equipment until this feeling goes away.  3. Mixing alcohol and pain medication can cause dizziness and slow your breathing. It can even be fatal. Do not drink alcohol while taking pain medication.  4. Avoid strenuous or risky activities.  Ask for help when climbing stairs.   5. You may feel lightheaded.  If so, sit for a few minutes before standing.  Have someone help you get up.   6. If you have nausea (feel sick to your stomach), drink only clear liquids such as apple juice, ginger ale, broth or 7-Up.  Rest may also help.  Be sure to drink enough fluids.  Move to a regular diet as you feel able. Take pain medications with a small amount of solid food, such as toast or crackers, to avoid nausea.   7. A slight fever is normal. Call the doctor if your fever is over 100 F (37.7 C) (taken under the tongue) or lasts longer than 24 hours.  8. You may have a dry mouth, muscle aches, trouble sleeping or a sore throat.  These symptoms should go away after 24 hours.  9. Do not make important or legal decisions.   Pain Management:      1. Take pain medication (if prescribed) for pain as directed by your physician.        2. WARNING: If the pain medication you have been prescribed contains Tylenol  (acetaminophen), DO NOT take additional doses of Tylenol (acetaminophen).     Call your doctor for any of the followin.  Signs of infection (fever, growing tenderness at the surgery site, severe pain, a large amount of drainage or bleeding, foul-smelling drainage, redness, swelling).    2.  It has been over 8 to 10 hours since surgery and you are still not able to urinate (pee).    3.   Headache for over 24 hours.    4.  Numbness, tingling or weakness the day after surgery (if you had spinal anesthesia).  To contact a doctor, call _____________________________________ or:      [phone] and ask for the Resident On Call for:          Ophthalmology (answered 24 hours a day)      Emergency Department:  [facility]: [phone]  [facility]: [phone]

## 2021-10-09 NOTE — ANESTHESIA CARE TRANSFER NOTE
Patient: Joce Maier    Procedure: Procedure(s):  SCLERAL FIXATION INTRAOCULAR LENS,YAMANE TECHNIQUE  PUPILLOPLASTY  Left eye: vitrectomy, intraocular lens removal       Diagnosis: Dislocated IOL (intraocular lens), posterior, left [T85.22XA]  Diagnosis Additional Information: No value filed.    Anesthesia Type:   MAC     Note:    Oropharynx: oropharynx clear of all foreign objects and spontaneously breathing  Level of Consciousness: awake  Oxygen Supplementation: room air    Independent Airway: airway patency satisfactory and stable  Dentition: dentition unchanged  Vital Signs Stable: post-procedure vital signs reviewed and stable  Report to RN Given: handoff report given  Patient transferred to: PACU  Comments: .Anesthesia Care Transfer Note    Patient: Joce Maier    Transferred to: Phase II    Patient vital signs: stable    Airway: none    Monitors applied, VSS.  Patient awake and comfortable, breathing spontaneously.  Report given to RN with transfer of care.        Ciarra Guerrero CRNA  10/8/2021  9:28 PM    Handoff Report: Identifed the Patient, Identified the Reponsible Provider, Reviewed the pertinent medical history, Discussed the surgical course, Reviewed Intra-OP anesthesia mangement and issues during anesthesia, Set expectations for post-procedure period and Allowed opportunity for questions and acknowledgement of understanding      Vitals:  Vitals Value Taken Time   /84 10/08/21 2113   Temp     Pulse 74 10/08/21 2113   Resp     SpO2 94 % 10/08/21 2123   Vitals shown include unvalidated device data.    Electronically Signed By: YOHANNES Yost CRNA  October 8, 2021  9:24 PM

## 2021-10-13 ENCOUNTER — OFFICE VISIT (OUTPATIENT)
Dept: OPHTHALMOLOGY | Facility: CLINIC | Age: 61
End: 2021-10-13
Attending: OPHTHALMOLOGY
Payer: OTHER MISCELLANEOUS

## 2021-10-13 DIAGNOSIS — Z48.810 AFTERCARE FOLLOWING SURGERY OF A SENSE ORGAN: ICD-10-CM

## 2021-10-13 DIAGNOSIS — Z96.1 PSEUDOPHAKIA OF RIGHT EYE: ICD-10-CM

## 2021-10-13 DIAGNOSIS — Z48.89 POSTOPERATIVE VISIT: Primary | ICD-10-CM

## 2021-10-13 PROCEDURE — G0463 HOSPITAL OUTPT CLINIC VISIT: HCPCS

## 2021-10-13 PROCEDURE — 99024 POSTOP FOLLOW-UP VISIT: CPT | Performed by: OPHTHALMOLOGY

## 2021-10-13 RX ORDER — PREDNISOLONE ACETATE 10 MG/ML
1 SUSPENSION/ DROPS OPHTHALMIC 4 TIMES DAILY
Qty: 10 ML | Refills: 0 | Status: SHIPPED | OUTPATIENT
Start: 2021-10-13 | End: 2021-11-08

## 2021-10-13 ASSESSMENT — CUP TO DISC RATIO: OS_RATIO: 0.2

## 2021-10-13 ASSESSMENT — VISUAL ACUITY
OD_CC: 20/20
OS_PH_CC: 20/125
OS_CC: 20/200
METHOD: SNELLEN - LINEAR

## 2021-10-13 ASSESSMENT — TONOMETRY
IOP_METHOD: TONOPEN
OD_IOP_MMHG: 14
OS_IOP_MMHG: 12

## 2021-10-13 ASSESSMENT — EXTERNAL EXAM - RIGHT EYE: OD_EXAM: NORMAL

## 2021-10-13 ASSESSMENT — SLIT LAMP EXAM - LIDS: COMMENTS: NORMAL

## 2021-10-13 ASSESSMENT — EXTERNAL EXAM - LEFT EYE: OS_EXAM: NORMAL

## 2021-10-13 NOTE — PROGRESS NOTES
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  He is referred today from Dr. Thompson for a left IOL dislocation and concern for a narrow angle on the right eye.      Interval history: POW#1 s/p lens explantation/scleral fixated IOL (Yamane)/pupilloplasty.  Pt notes some pain but slept well over night.  Notes vision seems improved from last week but still very blurry. Still notes some floaters left eye.      POHx:  Last eye exam: 10/11/21    Prior eye surgery/laser:   OGR OS 2008  PPV/SBP OS  2008 (JT)  Sutured 18.0 D [id number]  OS  2008 (Emperatriz)  S/p lens explantation/scleral fixated IOL (Yamane)/pupilloplasty (10/8/21)    CTL wearer: no  Glasses: yes    Family Hx of eye disease: no    Gtts Currenly Taking:  -Oflox QID OS  -Pred acetate QID OS    Allergies:  Cefprozil (hives)  PCN (Rash)  Hydrocodone (don't remember)    A/P:    # Dislocated IOL left eye: appears to still be attached in the nasal aspect  -s/p IOL explantation/scleral fixated IOL (Yamane)/pupilloplasty (10/8/21)  Doing well.  IOP is normal, there is still corneal edema.  Lens is nasally decentered but covers the visual axis.   -Discussed return precautions, activity limitations, and drop regimen    # Traumatic mydriasis and corectopia left eye     # No angle closure or narrow angle right eye  - Observe for now.      PLAN:  -Continue Oflox QID left eye for a full week - then STOP  -Pred acetate QID left eye for another week    Follow up Cornea:  POM1 V,T at next visit, call if sx worsen to clinic.    ALSO SEES:  Dr. Keven Jones, DO  Fellow, Cornea & External Disease  Department of Ophthalmology  [facility]    Attending Physician Attestation:  Complete documentation of  historical and exam elements from today's encounter can be found in the full encounter summary report (not reduplicated in this progress note).  I personally obtained the chief complaint(s) and history of present illness.  I confirmed and edited as necessary the review of systems, past medical/surgical history, family history, social history, and examination findings as documented by others; and I examined the patient myself.  I personally reviewed the relevant tests, images, and reports as documented above.  I formulated and edited as necessary the assessment and plan and discussed the findings and management plan with the patient and family. - Faustino Williamson MD

## 2021-10-13 NOTE — NURSING NOTE
Chief Complaints and History of Present Illnesses   Patient presents with     Post Op (Ophthalmology) Left Eye      Scleral-fixated intraocular lens (Yamane technique),Left  10/8/21   Pupilloplasty,   Pars plana vitrectomy (PPV) 25g, left eye,   Explantation of IOL 10/8/21     Chief Complaint(s) and History of Present Illness(es)     Post Op (Ophthalmology) Left Eye     Laterality: left eye    Course: stable    Associated symptoms: eye pain, redness and tearing.  Negative for headache    Treatments tried: eye drops    Pain scale: 1/10    Comments:  Scleral-fixated intraocular lens (Yamane technique),Left  10/8/21   Pupilloplasty,   Pars plana vitrectomy (PPV) 25g, left eye,   Explantation of IOL 10/8/21              Comments     Using:  Oflox QID OS  Pred acetate QID left eye    Ebony Garrett COT 7:36 AM October 13, 2021

## 2021-10-15 ENCOUNTER — OFFICE VISIT (OUTPATIENT)
Dept: OPHTHALMOLOGY | Facility: CLINIC | Age: 61
End: 2021-10-15
Attending: OPHTHALMOLOGY
Payer: OTHER MISCELLANEOUS

## 2021-10-15 DIAGNOSIS — Z48.89 POSTOPERATIVE VISIT: Primary | ICD-10-CM

## 2021-10-15 DIAGNOSIS — T85.22XA DISLOCATED IOL (INTRAOCULAR LENS), POSTERIOR, LEFT: ICD-10-CM

## 2021-10-15 DIAGNOSIS — Z98.890 HISTORY OF RUPTURED GLOBE REPAIR: ICD-10-CM

## 2021-10-15 DIAGNOSIS — Z48.810 AFTERCARE FOLLOWING SURGERY OF A SENSE ORGAN: ICD-10-CM

## 2021-10-15 PROCEDURE — G0463 HOSPITAL OUTPT CLINIC VISIT: HCPCS

## 2021-10-15 PROCEDURE — 99024 POSTOP FOLLOW-UP VISIT: CPT | Mod: GC | Performed by: OPHTHALMOLOGY

## 2021-10-15 ASSESSMENT — TONOMETRY
IOP_METHOD: TONOPEN
IOP_METHOD: TONOPEN
OS_IOP_MMHG: 17
OS_IOP_MMHG: 20

## 2021-10-15 ASSESSMENT — SLIT LAMP EXAM - LIDS
COMMENTS: NORMAL
COMMENTS: NORMAL

## 2021-10-15 ASSESSMENT — CUP TO DISC RATIO: OS_RATIO: 0.2

## 2021-10-15 ASSESSMENT — VISUAL ACUITY
OS_SC: 20/200
METHOD: SNELLEN - LINEAR
OS_PH_SC: 20/80
OS_CC: 20/200
OD_CC: 20/20-

## 2021-10-15 ASSESSMENT — EXTERNAL EXAM - RIGHT EYE: OD_EXAM: NORMAL

## 2021-10-15 ASSESSMENT — REFRACTION_WEARINGRX
OD_CYLINDER: +0.50
OS_CYLINDER: +1.75
OD_ADD: +2.50
OS_ADD: +2.50
OS_SPHERE: -2.00
OD_AXIS: 008
OD_SPHERE: +0.50
OS_AXIS: 055

## 2021-10-15 ASSESSMENT — EXTERNAL EXAM - LEFT EYE: OS_EXAM: NORMAL

## 2021-10-15 NOTE — PROGRESS NOTES
CC -   Post Op OS    INTERVAL HISTORY -  POW #1 OS s/p IOL exchange/PPV, VA improving, discomfort improving      PMH -     Joce Maier is a  61 year old year-old patient withh/o OGR left eye after injury with metal pin for concrete construction in 2008.  Had sutured IOL placed 2008, dislocated in 2021 with exchange done 10/2021      PAST OCULAR SURGERY  OGR OS 2008  PPV/SBP OS  2008 (JT)  Sutured 18.0 D CZ70BD  OS  2008 (Awan)  PPV/IOL explant/Yamane OS 10/8/21 (DDK+Teri)      RETINAL MAGING  OCT 9-10-21  OD- normal retina, PHF attached  OS- tr ERM, mild outer retina irregularity nasally; stable      ASSESSMENT & PLAN    # POW #1 OS   - s/p PPV/IOL explant/Yamane 10/8/21    -IOP OK, retina flat, no infection   - PF/ofloxacin gtts per Teri   - f/u 1 month      #.  H/o OGR left eye 2008   - s/p AC  reconstrcuction and sutured lens (Awan)   - s/p Pars plana vitrectomy (PPV)/SBP (JT)      #.  vitreous syneresis right eye   - advised S/Sx Retinal detachment 6/2021    #. Tr NS OD   - observe      #  Tr ERM OS   - not visually significant,    #. H/o Hollenhorst plaque and peripheral IRH OS   - noted 7/2017 IT arcade   - had w/u by PCP normal 2017 (carotid & TTE)        return to clinic:  1 month, OCT OU, DFE OS      ATTESTATION     Attending Physician Attestation:      Complete documentation of historical and exam elements from today's encounter can be found in the full encounter summary report (not reduplicated in this progress note).  I personally obtained the chief complaint(s) and history of present illness.  I confirmed and edited as necessary the review of systems, past medical/surgical history, family history, social history, and examination findings as documented by others; and I examined the patient myself.  I personally reviewed the relevant tests, images, and reports as documented above.  I formulated and edited as necessary the assessment and plan and discussed the findings and management plan with the  patient and family    Katie Thompson MD, PhD  , Vitreoretinal Surgery  Department of Ophthalmology  [facility]

## 2021-10-20 ENCOUNTER — DOCUMENTATION ONLY (OUTPATIENT)
Dept: OTHER | Facility: CLINIC | Age: 61
End: 2021-10-20

## 2021-10-21 ENCOUNTER — MYC MEDICAL ADVICE (OUTPATIENT)
Dept: OPHTHALMOLOGY | Facility: CLINIC | Age: 61
End: 2021-10-21

## 2021-10-21 NOTE — TELEPHONE ENCOUNTER
Spoke to pt at 0747    Left eye pain since day after eye visit 10-    Not worsening, but not improving    H/o light sensitivities since surgery two week ago-- not worsening    No noticed vision changes    No new redness  No discharge    Scheduled appt tomorrow with Dr. Thompson    Encouraged increase use of preservative free tears today-- every hour and may use lubricating eye ointment at night    Pt seemed comfortable with plan    Oleg Andrade RN 7:49 AM 10/21/21

## 2021-10-22 ENCOUNTER — OFFICE VISIT (OUTPATIENT)
Dept: OPHTHALMOLOGY | Facility: CLINIC | Age: 61
End: 2021-10-22
Attending: OPHTHALMOLOGY
Payer: OTHER MISCELLANEOUS

## 2021-10-22 DIAGNOSIS — Z98.890 POST-OPERATIVE STATE: Primary | ICD-10-CM

## 2021-10-22 PROCEDURE — G0463 HOSPITAL OUTPT CLINIC VISIT: HCPCS

## 2021-10-22 PROCEDURE — 99024 POSTOP FOLLOW-UP VISIT: CPT | Performed by: OPHTHALMOLOGY

## 2021-10-22 ASSESSMENT — SLIT LAMP EXAM - LIDS
COMMENTS: NORMAL
COMMENTS: NORMAL

## 2021-10-22 ASSESSMENT — VISUAL ACUITY
OD_CC+: +2
OS_CC: 20/100
OD_CC: 20/20
OS_PH_CC: 20/40
CORRECTION_TYPE: GLASSES
METHOD: SNELLEN - LINEAR

## 2021-10-22 ASSESSMENT — EXTERNAL EXAM - RIGHT EYE: OD_EXAM: NORMAL

## 2021-10-22 ASSESSMENT — REFRACTION_WEARINGRX
OD_AXIS: 008
OD_SPHERE: +0.50
OS_SPHERE: -2.00
OS_ADD: +2.50
OS_AXIS: 055
OD_CYLINDER: +0.50
OS_CYLINDER: +1.75
OD_ADD: +2.50

## 2021-10-22 ASSESSMENT — CONF VISUAL FIELD
METHOD: COUNTING FINGERS
OS_INFERIOR_NASAL_RESTRICTION: 3
OD_NORMAL: 1
OS_INFERIOR_TEMPORAL_RESTRICTION: 3

## 2021-10-22 ASSESSMENT — TONOMETRY
OS_IOP_MMHG: 19
IOP_METHOD: TONOPEN
OD_IOP_MMHG: 13

## 2021-10-22 ASSESSMENT — EXTERNAL EXAM - LEFT EYE: OS_EXAM: NORMAL

## 2021-10-22 ASSESSMENT — CUP TO DISC RATIO: OS_RATIO: 0.2

## 2021-10-22 NOTE — NURSING NOTE
Chief Complaints and History of Present Illnesses   Patient presents with     Eye Pain Left Eye     Chief Complaint(s) and History of Present Illness(es)     Eye Pain Left Eye               Comments     Constant achy and throbing eye pain in the back of LE x1 week. No changes in vision.  Still having redness in LE since surgery, not any worse.   BIJAN does not open all the way. LE is also very sensitive to light.  No flashes or floaters.    Kishor Bowers,  [facility] October 22, 2021 7:50 AM

## 2021-10-22 NOTE — PROGRESS NOTES
CC -   Post Op OS    INTERVAL HISTORY -  POW #2 OS s/p IOL exchange/PPV (10/8/21)   Increased soreness/pain since last week, no change vision  Using PF 4/day OS        PMH -     Joce Maier is a  61 year old year-old patient withh/o OGR left eye after injury with metal pin for concrete construction in 2008.  Had sutured IOL placed 2008, dislocated in 2021 with exchange done 10/2021      PAST OCULAR SURGERY  OGR OS 2008  PPV/SBP OS  2008 (JT)  Sutured 18.0 D CZ70BD  OS  2008 (Awan)  PPV/IOL explant/Yamane OS 10/8/21 (DDK+Teri)      RETINAL MAGING  OCT 9-10-21  OD- normal retina, PHF attached  OS- tr ERM, mild outer retina irregularity nasally; stable      ASSESSMENT & PLAN    # POW #2 OS   - s/p PPV/IOL explant/Yamane 10/8/21    - IOP OK, retina flat, no infection   - PF 4/day continue   - diffuse injection & pain, ?LEE   - add artificial tears & eb at bedtime   - return 2 weeks as scheduled      #.  H/o OGR left eye 2008   - s/p AC  reconstrcuction and sutured lens (Awan)   - s/p Pars plana vitrectomy (PPV)/SBP (JT)      #.  vitreous syneresis right eye   - advised S/Sx Retinal detachment 6/2021    #. Tr NS OD   - observe      #  Tr ERM OS   - not visually significant,    #. H/o Hollenhorst plaque and peripheral IRH OS   - noted 7/2017 IT arcade   - had w/u by PCP normal 2017 (carotid & TTE)        return to clinic:  2 weeks  OCT OU, DFE OS      ATTESTATION     Attending Physician Attestation:      Complete documentation of historical and exam elements from today's encounter can be found in the full encounter summary report (not reduplicated in this progress note).  I personally obtained the chief complaint(s) and history of present illness.  I confirmed and edited as necessary the review of systems, past medical/surgical history, family history, social history, and examination findings as documented by others; and I examined the patient myself.  I personally reviewed the relevant tests, images, and  reports as documented above.  I formulated and edited as necessary the assessment and plan and discussed the findings and management plan with the patient and family    Katie Thompson MD, PhD  , Vitreoretinal Surgery  Department of Ophthalmology  [facility]

## 2021-10-28 ENCOUNTER — TELEPHONE (OUTPATIENT)
Dept: OPHTHALMOLOGY | Facility: CLINIC | Age: 61
End: 2021-10-28

## 2021-10-28 NOTE — TELEPHONE ENCOUNTER
Spoke with Joce and Megan  Apologized for lack of communication and thanked them for letting us know.    Harris Urias

## 2021-11-01 ENCOUNTER — TELEPHONE (OUTPATIENT)
Dept: OPHTHALMOLOGY | Facility: CLINIC | Age: 61
End: 2021-11-01

## 2021-11-01 NOTE — TELEPHONE ENCOUNTER
I spoke to the emergency contact, Megan.  The patient still has Left eye POP unchanged eye pain.  I made an appointment for the patient for tomorrow.

## 2021-11-01 NOTE — TELEPHONE ENCOUNTER
[facility] Call Center    Phone Message    May a detailed message be left on voicemail: yes     Reason for Call: Other: Pt calling to request a call back. He states he has eye pain and needs to be seen asap. Please call him back as soon as possible to discuss.      Action Taken: Message routed to:  [facility]    Travel Screening: Not Applicable

## 2021-11-02 ENCOUNTER — OFFICE VISIT (OUTPATIENT)
Dept: OPHTHALMOLOGY | Facility: CLINIC | Age: 61
End: 2021-11-02
Attending: OPHTHALMOLOGY
Payer: OTHER MISCELLANEOUS

## 2021-11-02 DIAGNOSIS — Z98.890 POST-OPERATIVE STATE: Primary | ICD-10-CM

## 2021-11-02 PROCEDURE — 92134 CPTRZ OPH DX IMG PST SGM RTA: CPT | Performed by: OPHTHALMOLOGY

## 2021-11-02 PROCEDURE — G0463 HOSPITAL OUTPT CLINIC VISIT: HCPCS

## 2021-11-02 PROCEDURE — 99207 OCT RETINA SPECTRALIS OU (BOTH EYE): CPT | Performed by: OPHTHALMOLOGY

## 2021-11-02 PROCEDURE — 99024 POSTOP FOLLOW-UP VISIT: CPT | Performed by: OPHTHALMOLOGY

## 2021-11-02 ASSESSMENT — CUP TO DISC RATIO: OS_RATIO: 0.2

## 2021-11-02 ASSESSMENT — VISUAL ACUITY
METHOD: SNELLEN - LINEAR
CORRECTION_TYPE: GLASSES
OD_CC: 20/15
OS_CC+: -2
OS_CC: 20/60

## 2021-11-02 ASSESSMENT — TONOMETRY
OS_IOP_MMHG: 17
IOP_METHOD: TONOPEN
OD_IOP_MMHG: 10

## 2021-11-02 ASSESSMENT — EXTERNAL EXAM - LEFT EYE: OS_EXAM: NORMAL

## 2021-11-02 ASSESSMENT — CONF VISUAL FIELD
OS_INFERIOR_NASAL_RESTRICTION: 3
OD_NORMAL: 1
OS_INFERIOR_TEMPORAL_RESTRICTION: 3

## 2021-11-02 ASSESSMENT — SLIT LAMP EXAM - LIDS
COMMENTS: NORMAL
COMMENTS: NORMAL

## 2021-11-02 ASSESSMENT — EXTERNAL EXAM - RIGHT EYE: OD_EXAM: NORMAL

## 2021-11-02 NOTE — NURSING NOTE
Chief Complaints and History of Present Illnesses   Patient presents with     Eye Pain     Chief Complaint(s) and History of Present Illness(es)     Eye Pain     Quality: sharp pain and aching    Pain scale: 1/10    Associated symptoms: headaches, photophobia and foreign body sensation.  Negative for blurred vision              Comments     Pt here for follow up eye pain and is s/p PPV/IOL explant/Yamane OS 10/8/21. Pt reporting a constant eye pain left eye since surgery. He notes headaches mostly around the eye, and when it gets bad enough it hurts in his ear.  Pt using:  KIRILL MCKEON left eye  Refresh and Refresh PM  ESTHER STAUFFER 7:51 AM November 2, 2021

## 2021-11-02 NOTE — PROGRESS NOTES
CC -   Post Op OS    INTERVAL HISTORY -  POW #3 OS s/p IOL exchange/PPV (10/8/21)   Pain OS not improved, using eb at bedtime & artificial tears  Constant ache, some sharp pains too.  Has light sensitivity, wakes up at night  Tried tylenol no improvement    Using PF 4/day OS        PMH -     Joce Maier is a  61 year old year-old patient withh/o OGR left eye after injury with metal pin for concrete construction in 2008.  Had sutured IOL placed 2008, dislocated in 2021 with exchange done 10/2021      PAST OCULAR SURGERY  OGR OS 2008  PPV/SBP OS  2008 (JT)  Sutured 18.0 D CZ70BD  OS  2008 (Awan)  PPV/IOL explant/Yamane OS 10/8/21 (DDK+Teri)      RETINAL MAGING  OCT 11-2-21  OD- normal retina, PHF attached  OS- tr ERM, mild outer retina irregularity nasally; stable      ASSESSMENT & PLAN    # POW #3 OS   - s/p PPV/IOL explant/Yamane 10/8/21    - IOP OK, retina flat, no infection     - continued pain ?etiology   - minimal AC cell, has diffuse injection mild   - has suture granuloma ST     - PF 4/day continue   - continue artificial tears/eb      - use ibuprofen 400 q6 hours     - recheck 1 week      #.  H/o OGR left eye 2008   - s/p AC  reconstrcuction and sutured lens (Awan)   - s/p Pars plana vitrectomy (PPV)/SBP (JT)      #.  vitreous syneresis right eye   - advised S/Sx Retinal detachment 6/2021    #. Tr NS OD   - observe      #  Tr ERM OS   - not visually significant,    #. H/o Hollenhorst plaque and peripheral IRH OS   - noted 7/2017 IT arcade   - had w/u by PCP normal 2017 (carotid & TTE)        return to clinic:  1 weeks  OCT OU, DFE OS      ATTESTATION     Attending Physician Attestation:      Complete documentation of historical and exam elements from today's encounter can be found in the full encounter summary report (not reduplicated in this progress note).  I personally obtained the chief complaint(s) and history of present illness.  I confirmed and edited as necessary the review of systems, past  medical/surgical history, family history, social history, and examination findings as documented by others; and I examined the patient myself.  I personally reviewed the relevant tests, images, and reports as documented above.  I formulated and edited as necessary the assessment and plan and discussed the findings and management plan with the patient and family    Katie Thompson MD, PhD  , Vitreoretinal Surgery  Department of Ophthalmology  [facility]

## 2021-11-05 DIAGNOSIS — H35.372 EPIRETINAL MEMBRANE (ERM) OF LEFT EYE: Primary | ICD-10-CM

## 2021-11-08 ENCOUNTER — OFFICE VISIT (OUTPATIENT)
Dept: OPHTHALMOLOGY | Facility: CLINIC | Age: 61
End: 2021-11-08
Attending: OPHTHALMOLOGY
Payer: OTHER MISCELLANEOUS

## 2021-11-08 DIAGNOSIS — Z98.890 POST-OPERATIVE STATE: Primary | ICD-10-CM

## 2021-11-08 DIAGNOSIS — Z48.810 AFTERCARE FOLLOWING SURGERY OF A SENSE ORGAN: ICD-10-CM

## 2021-11-08 DIAGNOSIS — H35.372 EPIRETINAL MEMBRANE (ERM) OF LEFT EYE: ICD-10-CM

## 2021-11-08 DIAGNOSIS — Z96.1 PSEUDOPHAKIA OF LEFT EYE: ICD-10-CM

## 2021-11-08 PROCEDURE — 99024 POSTOP FOLLOW-UP VISIT: CPT | Mod: GC | Performed by: OPHTHALMOLOGY

## 2021-11-08 PROCEDURE — 99024 POSTOP FOLLOW-UP VISIT: CPT | Performed by: OPHTHALMOLOGY

## 2021-11-08 PROCEDURE — 999N000103 HC STATISTIC NO CHARGE FACILITY FEE

## 2021-11-08 PROCEDURE — 92134 CPTRZ OPH DX IMG PST SGM RTA: CPT | Performed by: OPHTHALMOLOGY

## 2021-11-08 PROCEDURE — G0463 HOSPITAL OUTPT CLINIC VISIT: HCPCS

## 2021-11-08 RX ORDER — DORZOLAMIDE HYDROCHLORIDE AND TIMOLOL MALEATE 20; 5 MG/ML; MG/ML
1 SOLUTION/ DROPS OPHTHALMIC 2 TIMES DAILY
Qty: 10 ML | Refills: 3 | Status: SHIPPED | OUTPATIENT
Start: 2021-11-08 | End: 2022-01-03

## 2021-11-08 RX ORDER — PREDNISOLONE ACETATE 10 MG/ML
1 SUSPENSION/ DROPS OPHTHALMIC 3 TIMES DAILY
Qty: 5 ML | Refills: 0 | Status: SHIPPED | OUTPATIENT
Start: 2021-11-08 | End: 2022-01-03

## 2021-11-08 ASSESSMENT — CONF VISUAL FIELD
OD_NORMAL: 1
OS_SUPERIOR_TEMPORAL_RESTRICTION: 3
OD_NORMAL: 1
OS_INFERIOR_TEMPORAL_RESTRICTION: 3
OS_INFERIOR_TEMPORAL_RESTRICTION: 3
OS_SUPERIOR_TEMPORAL_RESTRICTION: 3

## 2021-11-08 ASSESSMENT — SLIT LAMP EXAM - LIDS
COMMENTS: NORMAL

## 2021-11-08 ASSESSMENT — REFRACTION_WEARINGRX
OS_SPHERE: -2.00
OS_ADD: +2.50
OD_ADD: +2.50
OS_SPHERE: -2.00
OD_SPHERE: +0.50
OS_ADD: +2.50
OD_CYLINDER: +0.50
OD_CYLINDER: +0.50
OS_AXIS: 055
OD_ADD: +2.50
OD_SPHERE: +0.50
OS_AXIS: 055
OD_AXIS: 008
OS_CYLINDER: +1.75
OD_AXIS: 008
OS_CYLINDER: +1.75

## 2021-11-08 ASSESSMENT — TONOMETRY
OD_IOP_MMHG: 12
OS_IOP_MMHG: 26
IOP_METHOD: APPLANATION
IOP_METHOD: ICARE
OS_IOP_MMHG: 26
IOP_METHOD: ICARE
OS_IOP_MMHG: 33
OD_IOP_MMHG: 15
IOP_METHOD: TONOPEN
OD_IOP_MMHG: 12
IOP_METHOD: TONOPEN
OD_IOP_MMHG: 15
OS_IOP_MMHG: 33
OS_IOP_MMHG: 28

## 2021-11-08 ASSESSMENT — VISUAL ACUITY
OS_CC: 20/40
CORRECTION_TYPE: GLASSES
METHOD: SNELLEN - LINEAR
CORRECTION_TYPE: GLASSES
METHOD: SNELLEN - LINEAR
OS_CC: 20/40
OS_CC+: -2
OD_CC: 20/15
OD_CC+: -1
OD_CC+: -1
OD_CC: 20/15
OS_CC+: -2

## 2021-11-08 ASSESSMENT — EXTERNAL EXAM - RIGHT EYE
OD_EXAM: NORMAL
OD_EXAM: NORMAL

## 2021-11-08 ASSESSMENT — EXTERNAL EXAM - LEFT EYE
OS_EXAM: NORMAL
OS_EXAM: NORMAL

## 2021-11-08 ASSESSMENT — CUP TO DISC RATIO
OS_RATIO: 0.2
OS_RATIO: 0.2

## 2021-11-08 NOTE — PROGRESS NOTES
Pt will follow locally to have IOP's checked - has appt's set with Dr. Boyle in [place] (today's visit note faxed to them at [phone]).    ESTHER Torres 12:48 PM November 8, 2021

## 2021-11-08 NOTE — NURSING NOTE
Chief Complaints and History of Present Illnesses   Patient presents with     Follow Up     4 week follow up s/p IOL explantation/scleral fixated IOL (Yamane)/pupilloplasty (10/8/21)     Chief Complaint(s) and History of Present Illness(es)     Follow Up     Comments: 4 week follow up s/p IOL explantation/scleral fixated IOL (Yamane)/pupilloplasty (10/8/21)              Comments     Pt states vision has been good since last visit. No eye pain today. When pt has pain he uses advil and tylenol for relief.  No flashes or floaters. No redness or dryness.  No discharge.    ASHER Montez November 8, 2021 7:39 AM

## 2021-11-08 NOTE — NURSING NOTE
No chief complaint on file.    Chief Complaint(s) and History of Present Illness(es)     Pt states vision has been good since last visit. No eye pain today. When pt has pain he uses advil and tylenol for relief.  No flashes or floaters. No redness or dryness.  No discharge.    ASHER Montez November 8, 2021 7:39 AM

## 2021-11-08 NOTE — PROGRESS NOTES
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  He is referred today from Dr. Thompson for a left IOL dislocation and concern for a narrow angle on the right eye.      Interval history: POM#1 s/p lens explantation/scleral fixated IOL (Yamane)/pupilloplasty.  Pt notes improvement in pain. Denies flashes/floaters.  Light sensitivity has improved as well.        POHx:  OGR OS 2008  PPV/SBP OS  2008 (BRIA)  Sutured 18.0 D CZ70BD  OS  2008 (Emperatriz)  S/p lens explantation/scleral fixated IOL (Yamane)/pupilloplasty (10/8/21)    CTL wearer: no  Glasses: yes    Family Hx of eye disease: no    Gtts Currenly Taking:  -Pred acetate QID OS    Allergies:  Cefprozil (hives)  PCN (Rash)  Hydrocodone (don't remember)    OCT Mac (11/3/21)  No CME OS    A/P:    # Dislocated IOL left eye: appears to still be attached in the nasal aspect  -s/p IOL explantation/scleral fixated IOL (Yamane)/pupilloplasty (10/8/21)  Doing well.  IOP is elevated today, likely steroid response.  Minimal to no inflammation.  -Discussed return precautions, activity limitations, and drop regimen    # Traumatic mydriasis and corectopia left eye     # No angle closure or narrow angle right eye  - Observe for now.    PLAN:  - Decrease Pred acetate TID for one week, then BID for 1 week, then Qday for 1 week, then stop  - monitor IOP with steroid taper  - no CME on mac OCT 11/8/21  - f/u as scheduled with Dr. Thompson  - plan for MRx OS at follow-up    Follow up Cornea:  POM1 V,T at next visit, call if sx worsen to clinic.    ALSO SEES:  Dr. Keven Jones, DO  Fellow, Cornea & External Disease  Department of Ophthalmology  [facility]    Attending Physician Attestation:  Complete  documentation of historical and exam elements from today's encounter can be found in the full encounter summary report (not reduplicated in this progress note).  I personally obtained the chief complaint(s) and history of present illness.  I confirmed and edited as necessary the review of systems, past medical/surgical history, family history, social history, and examination findings as documented by others; and I examined the patient myself.  I personally reviewed the relevant tests, images, and reports as documented above.  I formulated and edited as necessary the assessment and plan and discussed the findings and management plan with the patient and family. - Faustino Williamson MD

## 2021-11-08 NOTE — PROGRESS NOTES
CC -   Post Op OS    INTERVAL HISTORY -  POW #3 OS s/p IOL exchange/PPV (10/8/21)   Pain OS much better with ibuprofen 400 mg BID    Using PF 3/day OS tapering weekly      PMH -     Joce Maier is a  61 year old year-old patient withh/o OGR left eye after injury with metal pin for concrete construction in 2008.  Had sutured IOL placed 2008, dislocated in 2021 with exchange done 10/2021      PAST OCULAR SURGERY  OGR OS 2008  PPV/SBP OS  2008 (JT)  Sutured 18.0 D CZ70BD  OS  2008 (Awan)  PPV/IOL explant/Yamane OS 10/8/21 (DDK+Teri)      RETINAL MAGING  OCT 11-2-21  OD- normal retina, PHF attached  OS- tr ERM, mild outer retina irregularity nasally; stable      ASSESSMENT & PLAN    # POW #3 OS   - s/p PPV/IOL explant/Yamane 10/8/21    - IOP OK, retina flat, no infection     - continued pain ?etiology   - minimal AC cell, has diffuse injection mild   - has suture granuloma ST     - PF 3/day continue taper - refill needed today to prevent abrupt cessation   - continue artificial tears/eb   - use ibuprofen 400 q6 PRN     - IOP elevated today, ?steroid response   - tapering PF off   - add cosopt BID   - will get IOP checked locally in 1 weeks and 1 month after        #.  H/o OGR left eye 2008   - s/p AC  reconstrcuction and sutured lens (Awan)   - s/p Pars plana vitrectomy (PPV)/SBP (JT)      #.  vitreous syneresis right eye   - advised S/Sx Retinal detachment 6/2021    #. Tr NS OD   - observe      #  Tr ERM OS   - not visually significant,    #. H/o Hollenhorst plaque and peripheral IRH OS   - noted 7/2017 IT arcade   - had w/u by PCP normal 2017 (carotid & TTE)        return to clinic:  2 months, OCT OU, DFE OU      ATTESTATION     Attending Physician Attestation:      Complete documentation of historical and exam elements from today's encounter can be found in the full encounter summary report (not reduplicated in this progress note).  I personally obtained the chief complaint(s) and history of present  illness.  I confirmed and edited as necessary the review of systems, past medical/surgical history, family history, social history, and examination findings as documented by others; and I examined the patient myself.  I personally reviewed the relevant tests, images, and reports as documented above.  I formulated and edited as necessary the assessment and plan and discussed the findings and management plan with the patient and family    Katie Thompson MD, PhD  , Vitreoretinal Surgery  Department of Ophthalmology  [facility]

## 2021-11-18 ENCOUNTER — MYC MEDICAL ADVICE (OUTPATIENT)
Dept: OPHTHALMOLOGY | Facility: CLINIC | Age: 61
End: 2021-11-18

## 2021-11-18 NOTE — TELEPHONE ENCOUNTER
Spoke to pt at 1410    Increase tearing in post-op eye starting today.    Off and on tearing all day.    No pain  No new redness  No vision changes when not tearing    Scheduled exam tomorrow with Dr. Thompson    Recommended increase use of PF tears today and may use lubricating ointment at night    Reviewed if symptoms resolve-- ok to hold on appt (pt travels 2 hours away)    Pt verbally demonstrated understanding and seemed comfortable with plan    Oleg Andrade RN 2:21 PM 11/18/21

## 2021-11-19 ENCOUNTER — OFFICE VISIT (OUTPATIENT)
Dept: OPHTHALMOLOGY | Facility: CLINIC | Age: 61
End: 2021-11-19
Attending: OPHTHALMOLOGY
Payer: OTHER MISCELLANEOUS

## 2021-11-19 DIAGNOSIS — Z98.890 POST-OPERATIVE STATE: Primary | ICD-10-CM

## 2021-11-19 PROCEDURE — 99024 POSTOP FOLLOW-UP VISIT: CPT | Performed by: OPHTHALMOLOGY

## 2021-11-19 PROCEDURE — G0463 HOSPITAL OUTPT CLINIC VISIT: HCPCS

## 2021-11-19 RX ORDER — CARBOXYMETHYLCELLULOSE SODIUM 5 MG/ML
1 SOLUTION/ DROPS OPHTHALMIC 3 TIMES DAILY PRN
COMMUNITY

## 2021-11-19 ASSESSMENT — VISUAL ACUITY
OS_CC+: -2
OD_CC: 20/15
METHOD: SNELLEN - LINEAR
OS_PH_CC: 20/40
OD_CC+: -1
OS_CC: 20/50
OS_PH_CC+: -1

## 2021-11-19 ASSESSMENT — REFRACTION_WEARINGRX
OS_CYLINDER: +1.75
OD_AXIS: 008
OD_CYLINDER: +0.50
OD_SPHERE: +0.50
OS_AXIS: 055
OD_ADD: +2.50
OS_ADD: +2.50
OS_SPHERE: -2.00

## 2021-11-19 ASSESSMENT — CUP TO DISC RATIO: OS_RATIO: 0.2

## 2021-11-19 ASSESSMENT — SLIT LAMP EXAM - LIDS
COMMENTS: NORMAL
COMMENTS: NORMAL

## 2021-11-19 ASSESSMENT — TONOMETRY
OD_IOP_MMHG: 15
OS_IOP_MMHG: 14
IOP_METHOD: TONOPEN

## 2021-11-19 ASSESSMENT — EXTERNAL EXAM - LEFT EYE: OS_EXAM: NORMAL

## 2021-11-19 ASSESSMENT — EXTERNAL EXAM - RIGHT EYE: OD_EXAM: NORMAL

## 2021-11-19 NOTE — NURSING NOTE
Chief Complaints and History of Present Illnesses   Patient presents with     Post Op (Ophthalmology) Left Eye     Chief Complaint(s) and History of Present Illness(es)     Post Op (Ophthalmology) Left Eye     Laterality: left eye    Onset: sudden    Frequency: constantly    Associated symptoms: eye pain (LE)    Response to treatment: no improvement    Pain scale: 1/10              Comments     Joce is here  Complaining of constant tearing since his surgery. He says he feels sutures LE. He has been using Refresh in the day and PM at night with no improvement    Mehdi Dey COT 7:37 AM November 19, 2021

## 2021-11-19 NOTE — PROGRESS NOTES
CC -   Post Op OS    INTERVAL HISTORY -  POM #1  OS s/p IOL exchange/PPV (10/8/21)   Using ibuprofen still 3 times per day 400 mg (reduced for a while but got worse)  Increased tearing noted 2 days ago not improved with artificial tears  VA stable prior to tearing  No redness    Using PF 2/day plans to reduce to 1/day next week, using Cosopt 2/day OS      PMH -     Joce Maier is a  61 year old year-old patient withh/o OGR left eye after injury with metal pin for concrete construction in 2008.  Had sutured IOL placed 2008, dislocated in 2021 with exchange done 10/2021      PAST OCULAR SURGERY  OGR OS 2008  PPV/SBP OS  2008 (JT)  Sutured 18.0 D CZ70BD  OS  2008 (Awan)  PPV/IOL explant/Yamane OS 10/8/21 (DDK+Teri)      RETINAL MAGING  OCT 11-2-21  OD- normal retina, PHF attached  OS- tr ERM, mild outer retina irregularity nasally; stable      ASSESSMENT & PLAN    # POM #1  OS   - s/p PPV/IOL explant/Yamane 10/8/21    - IOP OK, retina flat, no infection     - continued pain ?etiology, small granuloma ST improving   - tearing noted 11/19/21 ?etiology        - continue PF taper   - continue cosopt   - continue artificial tears/eb   - use ibuprofen PRN - d/w patient H2 blocker   - recheck 1-2 months      #.  H/o OGR left eye 2008   - s/p AC  reconstrcuction and sutured lens (Awan)   - s/p Pars plana vitrectomy (PPV)/SBP (JT)      #.  vitreous syneresis right eye   - advised S/Sx Retinal detachment 6/2021    #. Tr NS OD   - observe      #  Tr ERM OS   - not visually significant,    #. H/o Hollenhorst plaque and peripheral IRH OS   - noted 7/2017 IT arcade   - had w/u by PCP normal 2017 (carotid & TTE)        return to clinic:  2 months, OCT OU, DFE OU      ATTESTATION     Attending Physician Attestation:      Complete documentation of historical and exam elements from today's encounter can be found in the full encounter summary report (not reduplicated in this progress note).  I personally obtained the chief  complaint(s) and history of present illness.  I confirmed and edited as necessary the review of systems, past medical/surgical history, family history, social history, and examination findings as documented by others; and I examined the patient myself.  I personally reviewed the relevant tests, images, and reports as documented above.  I formulated and edited as necessary the assessment and plan and discussed the findings and management plan with the patient and family    Katie Thompson MD, PhD  , Vitreoretinal Surgery  Department of Ophthalmology  [facility]

## 2021-11-20 ENCOUNTER — HEALTH MAINTENANCE LETTER (OUTPATIENT)
Age: 61
End: 2021-11-20

## 2021-12-06 ENCOUNTER — TRANSFERRED RECORDS (OUTPATIENT)
Dept: HEALTH INFORMATION MANAGEMENT | Facility: CLINIC | Age: 61
End: 2021-12-06

## 2022-01-03 ENCOUNTER — OFFICE VISIT (OUTPATIENT)
Dept: OPHTHALMOLOGY | Facility: CLINIC | Age: 62
End: 2022-01-03
Attending: OPHTHALMOLOGY
Payer: OTHER MISCELLANEOUS

## 2022-01-03 DIAGNOSIS — Z98.890 POST-OPERATIVE STATE: ICD-10-CM

## 2022-01-03 DIAGNOSIS — Z96.1 PSEUDOPHAKIA OF RIGHT EYE: ICD-10-CM

## 2022-01-03 DIAGNOSIS — Z98.890 POST-OPERATIVE STATE: Primary | ICD-10-CM

## 2022-01-03 DIAGNOSIS — T85.22XA DISLOCATED IOL (INTRAOCULAR LENS), POSTERIOR, LEFT: ICD-10-CM

## 2022-01-03 DIAGNOSIS — Z48.810 AFTERCARE FOLLOWING SURGERY OF A SENSE ORGAN: Primary | ICD-10-CM

## 2022-01-03 DIAGNOSIS — Z96.1 PSEUDOPHAKIA OF LEFT EYE: Primary | ICD-10-CM

## 2022-01-03 DIAGNOSIS — Z98.890 HISTORY OF RUPTURED GLOBE REPAIR: ICD-10-CM

## 2022-01-03 PROCEDURE — 76513 OPH US DX ANT SGM US UNI/BI: CPT | Performed by: OPHTHALMOLOGY

## 2022-01-03 PROCEDURE — 99024 POSTOP FOLLOW-UP VISIT: CPT | Performed by: OPHTHALMOLOGY

## 2022-01-03 PROCEDURE — 92015 DETERMINE REFRACTIVE STATE: CPT

## 2022-01-03 PROCEDURE — 92134 CPTRZ OPH DX IMG PST SGM RTA: CPT | Performed by: OPHTHALMOLOGY

## 2022-01-03 PROCEDURE — G0463 HOSPITAL OUTPT CLINIC VISIT: HCPCS | Mod: 25

## 2022-01-03 PROCEDURE — 999N000103 HC STATISTIC NO CHARGE FACILITY FEE

## 2022-01-03 PROCEDURE — 99024 POSTOP FOLLOW-UP VISIT: CPT | Mod: GC | Performed by: OPHTHALMOLOGY

## 2022-01-03 ASSESSMENT — CONF VISUAL FIELD
OD_NORMAL: 1
METHOD: COUNTING FINGERS
OS_INFERIOR_TEMPORAL_RESTRICTION: 3
OS_INFERIOR_TEMPORAL_RESTRICTION: 3
OD_NORMAL: 1
METHOD: COUNTING FINGERS

## 2022-01-03 ASSESSMENT — VISUAL ACUITY
OS_PH_SC: 20/30
OS_PH_SC: 20/30
OS_SC: 20/40
OS_SC+: +1
METHOD: SNELLEN - LINEAR
OD_CC: 20/15
METHOD: SNELLEN - LINEAR
OS_PH_SC+: -2
OD_CC: 20/15
OS_SC: 20/40
OS_SC+: +1
OS_PH_SC+: -2

## 2022-01-03 ASSESSMENT — REFRACTION_WEARINGRX
OS_SPHERE: -2.00
OS_CYLINDER: +1.75
OS_AXIS: 055
OD_SPHERE: +0.50
OD_AXIS: 008
OD_ADD: +2.50
OS_ADD: +2.50
OD_ADD: +2.50
OS_AXIS: 055
OD_CYLINDER: +0.50
OS_SPHERE: -2.00
OD_AXIS: 008
OS_CYLINDER: +1.75
OD_SPHERE: +0.50
OS_ADD: +2.50
OD_CYLINDER: +0.50

## 2022-01-03 ASSESSMENT — REFRACTION_MANIFEST
OS_ADD: +2.50
OS_SPHERE: -0.50
OD_CYLINDER: +0.50
OS_AXIS: 073
OD_SPHERE: +0.50
OS_ADD: +2.50
OD_SPHERE: +0.50
OD_ADD: +2.50
OS_CYLINDER: +0.75
OD_AXIS: 008
OS_SPHERE: -0.50
OD_AXIS: 008
OD_ADD: +2.50
OS_CYLINDER: +0.75
OS_AXIS: 073
OD_CYLINDER: +0.50

## 2022-01-03 ASSESSMENT — SLIT LAMP EXAM - LIDS
COMMENTS: NORMAL

## 2022-01-03 ASSESSMENT — TONOMETRY
IOP_METHOD: TONOPEN
OS_IOP_MMHG: 18
IOP_METHOD: TONOPEN
OD_IOP_MMHG: 15
OD_IOP_MMHG: 15
OS_IOP_MMHG: 18

## 2022-01-03 ASSESSMENT — CUP TO DISC RATIO
OS_RATIO: 0.2
OS_RATIO: 0.2

## 2022-01-03 ASSESSMENT — EXTERNAL EXAM - RIGHT EYE
OD_EXAM: NORMAL
OD_EXAM: NORMAL

## 2022-01-03 ASSESSMENT — REFRACTION: OD_SPHERE: +0.50

## 2022-01-03 ASSESSMENT — EXTERNAL EXAM - LEFT EYE
OS_EXAM: NORMAL
OS_EXAM: NORMAL

## 2022-01-03 NOTE — NURSING NOTE
Chief Complaints and History of Present Illnesses   Patient presents with     Post Op (Ophthalmology) Left Eye     Chief Complaint(s) and History of Present Illness(es)     Post Op (Ophthalmology) Left Eye     Laterality: left eye    Associated symptoms: Negative for eye pain, dryness, floaters and flashes    Pain scale: 0/10              Comments     Joce is here  2 months post cataract surgery with IOL implant from 10-8-21. He states LE feels better than last visit.     Mehdi Dey COT 7:42 AM January 3, 2022

## 2022-01-03 NOTE — PROGRESS NOTES
CC -   Post Op OS    INTERVAL HISTORY -  POM #2  OS s/p IOL exchange/PPV (10/8/21)   No pain, not requiring ibuprofen  No gtts OS      Last DFE OD 6/2021      PMH -     Joce Maier is a  61 year old year-old patient withh/o OGR left eye after injury with metal pin for concrete construction in 2008.  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  H/o OGR left eye 2008   - s/p AC  reconstrcuction and sutured lens (Awan)   - s/p Pars plana vitrectomy (PPV)/SBP (JT)      #.  vitreous syneresis right eye   - advised S/Sx Retinal detachment  12/2021    #. Tr NS OD   - observe      #  Tr ERM OS   - not visually significant,    #. H/o Hollenhorst plaque and peripheral IRH OS   - noted 7/2017 IT arcade   - had w/u by PCP normal 2017 (carotid & TTE)        return to clinic:  3 months, OCT OU, DFE OU      ATTESTATION     Attending Physician Attestation:      Complete documentation of historical and exam elements from today's encounter can be found in the full encounter summary report (not reduplicated in this progress note).  I personally obtained the chief complaint(s) and history of present illness.  I  confirmed and edited as necessary the review of systems, past medical/surgical history, family history, social history, and examination findings as documented by others; and I examined the patient myself.  I personally reviewed the relevant tests, images, and reports as documented above.  I formulated and edited as necessary the assessment and plan and discussed the findings and management plan with the patient and family    Katie Thompson MD, PhD  , Vitreoretinal Surgery  Department of Ophthalmology  [facility]

## 2022-01-03 NOTE — PATIENT INSTRUCTIONS
Use these drops in your left eye:    Prednisolone Acetate (pink top) -- 3 times per day for 2 weeks then twice per day until you see Dr. Jones

## 2022-01-03 NOTE — LETTER
1/3/2022        RE: Joce Maier  [address]  [place] [address]        CC: Dislocated IOL LE, Loss of vision since 9/6/21, seen by on call, diagnosed with dislocated IOL now s/p Scleral fixed IOL left eye     Referring Provider: Dr Katie Thompson    HPI:    Joce Maier 61 year old White male patient presenting for h/o OGR left eye after injury with metal pin for concrete construction.  He is referred today from Dr. Thompson for a left IOL dislocation and concern for a narrow angle on the right eye.      Interval history: POM#3 s/p lens explantation/scleral fixated IOL (Yamane)/pupilloplasty.  Pt notes improvement in pain. Denies flashes/floaters. Light sensitivity has improved as well -- althought still present. No drainage or discharge. Eye is red c/t right eye.     POHx:  OGR OS 2008  PPV/SBP OS  2008 (JT)  Sutured 18.0 D CZ70BD  OS  2008 (Emperatriz)  S/p lens explantation/scleral fixated IOL (Yamane)/pupilloplasty OS (10/8/21).     CTL wearer: no  Glasses: yes    Family Hx of eye disease: no    Gtts Currenly Taking:  AT's PM at bedtime, left eye     Allergies:  Cefprozil (hives)  PCN (Rash)  Hydrocodone (don't remember)    OCT Mac (1/3/22)  No CME OS    A/P:    # Dislocated IOL left eye: appears to still be attached in the nasal aspect  - s/p IOL explantation/scleral fixated IOL (Yamane)/pupilloplasty (10/8/21)  Doing well.  IOP is within normal limits today w/o gtts.  - 2+ inflammation present on exam.  Possibly from the inferior lens edge but no iris movement.  - Discussed return precautions, activity limitations, and drop regimen    # Traumatic mydriasis and corectopia left eye     # No angle closure or narrow angle right eye  - Observe for now.    PLAN:  - Start Pred Forte QID left eye x 2 weeks, then reduce to TID left eye x 2 weeks then reassess   - Monitor IOP with steroid   - MRx provided today     - F/u as scheduled with Dr. Thompson    Follow up Cornea:  4 weeks with Cornea but  follow up in 2 weeks with local O.D. ([facility]) - Needs vision, IOP, and anterior chamber check     ALSO SEES:  Dr. Donna Prince MD - PGY3  Department of Ophthalmology  Pager: [phone]    Attending Physician Attestation:  Complete documentation of historical and exam elements from today's encounter can be found in the full encounter summary report (not reduplicated in this progress note).  I personally obtained the chief complaint(s) and history of present illness.  I confirmed and edited as necessary the review of systems, past medical/surgical history, family history, social history, and examination findings as documented by others; and I examined the patient myself.  I formulated and edited as necessary the assessment and plan and discussed the findings and management plan with the patient and family.     Stefan Jones DO  Cornea Fellow            Sincerely,        Stefan Jones DO

## 2022-01-03 NOTE — PROGRESS NOTES
CC: Dislocated IOL LE, Loss of vision since 9/6/21, seen by on call, diagnosed with dislocated IOL now s/p Scleral fixed IOL left eye     Referring Provider: Dr Katie Thompson    HPI:    Joce MENDOZA Maier 61 year old White male patient presenting for h/o OGR left eye after injury with metal pin for concrete construction.  He is referred today from Dr. Thompson for a left IOL dislocation and concern for a narrow angle on the right eye.      Interval history: POM#3 s/p lens explantation/scleral fixated IOL (Yamane)/pupilloplasty.  Pt notes improvement in pain. Denies flashes/floaters. Light sensitivity has improved as well -- althought still present. No drainage or discharge. Eye is red c/t right eye.     POHx:  OGR OS 2008  PPV/SBP OS  2008 (JT)  Sutured 18.0 D CZ70BD  OS  2008 (Emperatriz)  S/p lens explantation/scleral fixated IOL (Yamane)/pupilloplasty OS (10/8/21).     CTL wearer: no  Glasses: yes    Family Hx of eye disease: no    Gtts Currenly Taking:  AT's PM at bedtime, left eye     Allergies:  Cefprozil (hives)  PCN (Rash)  Hydrocodone (don't remember)    OCT Mac (1/3/22)  No CME OS    A/P:    # Dislocated IOL left eye: appears to still be attached in the nasal aspect  - s/p IOL explantation/scleral fixated IOL (Yamane)/pupilloplasty (10/8/21)  Doing well.  IOP is within normal limits today w/o gtts.  - 2+ inflammation present on exam.  Possibly from the inferior lens edge but no iris movement.  - Discussed return precautions, activity limitations, and drop regimen    # Traumatic mydriasis and corectopia left eye     # No angle closure or narrow angle right eye  - Observe for now.    PLAN:  - Start Pred Forte QID left eye x 2 weeks, then reduce to TID left eye x 2 weeks then reassess   - Monitor IOP with steroid   - MRx provided today     - F/u as scheduled with Dr. Thompson    Follow up Cornea:  4 weeks with Cornea but follow up in 2 weeks with local O.D. ([facility]) - Needs vision, IOP, and  anterior chamber check     ALSO SEES:  Dr. Donna Prince MD - PGY3  Department of Ophthalmology  Pager: [phone]    Attending Physician Attestation:  Complete documentation of historical and exam elements from today's encounter can be found in the full encounter summary report (not reduplicated in this progress note).  I personally obtained the chief complaint(s) and history of present illness.  I confirmed and edited as necessary the review of systems, past medical/surgical history, family history, social history, and examination findings as documented by others; and I examined the patient myself.  I formulated and edited as necessary the assessment and plan and discussed the findings and management plan with the patient and family.     Stefan Jones,   Cornea Fellow

## 2022-01-03 NOTE — NURSING NOTE
Chief Complaints and History of Present Illnesses   Patient presents with     Post Op (Ophthalmology) Left Eye     Chief Complaint(s) and History of Present Illness(es)     Post Op (Ophthalmology) Left Eye     Laterality: left eye    Associated symptoms: Negative for eye pain, flashes and dryness    Response to treatment: mild improvement    Pain scale: 0/10              Comments     Joce is here  2 months post cataract surgery with IOL implant from 10-8-21. He states LE feels better than last visit.     Mehdi Dey COT 7:41 AM January 3, 2022

## 2022-01-17 ENCOUNTER — TRANSFERRED RECORDS (OUTPATIENT)
Dept: HEALTH INFORMATION MANAGEMENT | Facility: CLINIC | Age: 62
End: 2022-01-17

## 2022-01-18 DIAGNOSIS — Z98.890 POST-OPERATIVE STATE: Primary | ICD-10-CM

## 2022-01-18 DIAGNOSIS — Z98.890 POST-OPERATIVE STATE: ICD-10-CM

## 2022-01-18 RX ORDER — PREDNISOLONE ACETATE 10 MG/ML
1 SUSPENSION/ DROPS OPHTHALMIC 2 TIMES DAILY
Qty: 15 ML | Refills: 4 | Status: SHIPPED | OUTPATIENT
Start: 2022-01-18

## 2022-01-18 RX ORDER — DORZOLAMIDE HYDROCHLORIDE AND TIMOLOL MALEATE 20; 5 MG/ML; MG/ML
1 SOLUTION/ DROPS OPHTHALMIC 2 TIMES DAILY
Qty: 10 ML | Refills: 1 | Status: SHIPPED | OUTPATIENT
Start: 2022-01-18 | End: 2022-04-19

## 2022-01-18 RX ORDER — PREDNISOLONE ACETATE 10 MG/ML
1 SUSPENSION/ DROPS OPHTHALMIC 3 TIMES DAILY
Qty: 15 ML | Refills: 4 | Status: SHIPPED | OUTPATIENT
Start: 2022-01-18 | End: 2022-01-18

## 2022-01-20 ENCOUNTER — TELEPHONE (OUTPATIENT)
Dept: OPHTHALMOLOGY | Facility: CLINIC | Age: 62
End: 2022-01-20

## 2022-01-20 NOTE — TELEPHONE ENCOUNTER
Pt's wife (Megan) called 1/17 to report that pt's eye pressure was up to 30 when checked locally that AM.    Jairon Jones and Donna were notified and Rx's placed for prednisolone TID and Cosopt BID.    Dr. Thompson responded on 1/20 that pt wouldn't need to follow-up locally for eye pressure check prior to 2/7 visit with Dr. Jones - I called and informed pt's wife of this today. I did let her know that if he has new eye pain, brow ache, or decrease in vision that he should be seen sooner. She expressed understanding of this and was thankful for the follow-up.    Kerry Henry, COA 3:45 PM January 20, 2022

## 2022-02-07 ENCOUNTER — OFFICE VISIT (OUTPATIENT)
Dept: OPHTHALMOLOGY | Facility: CLINIC | Age: 62
End: 2022-02-07
Attending: OPHTHALMOLOGY
Payer: OTHER MISCELLANEOUS

## 2022-02-07 DIAGNOSIS — Z96.1 PSEUDOPHAKIA OF LEFT EYE: Primary | ICD-10-CM

## 2022-02-07 PROCEDURE — 99213 OFFICE O/P EST LOW 20 MIN: CPT | Performed by: OPHTHALMOLOGY

## 2022-02-07 PROCEDURE — G0463 HOSPITAL OUTPT CLINIC VISIT: HCPCS

## 2022-02-07 ASSESSMENT — TONOMETRY
OS_IOP_MMHG: 15
IOP_METHOD: ICARE
OD_IOP_MMHG: 14

## 2022-02-07 ASSESSMENT — CONF VISUAL FIELD
OS_NORMAL: 1
METHOD: COUNTING FINGERS
OD_NORMAL: 1

## 2022-02-07 ASSESSMENT — VISUAL ACUITY
METHOD: SNELLEN - LINEAR
OS_SC: 20/40
OS_PH_SC: 20/30
OD_CC: 20/20

## 2022-02-07 ASSESSMENT — SLIT LAMP EXAM - LIDS
COMMENTS: NORMAL
COMMENTS: NORMAL

## 2022-02-07 ASSESSMENT — EXTERNAL EXAM - LEFT EYE: OS_EXAM: NORMAL

## 2022-02-07 ASSESSMENT — CUP TO DISC RATIO: OS_RATIO: 0.2

## 2022-02-07 ASSESSMENT — EXTERNAL EXAM - RIGHT EYE: OD_EXAM: NORMAL

## 2022-02-07 NOTE — PROGRESS NOTES
CC: Dislocated IOL LE, Loss of vision since 9/6/21, seen by on call, diagnosed with dislocated IOL now s/p Scleral fixed IOL left eye     Referring Provider: Dr Katie Thompson    HPI:    Joce MENDOZA Maier 61 year old White male patient presenting for h/o OGR left eye after injury with metal pin for concrete construction.  He is referred today from Dr. Thompson for a left IOL dislocation and concern for a narrow angle on the right eye.      Interval history: POM#4 s/p lens explantation/scleral fixated IOL (Yamane)/pupilloplasty.  Pt notes no pain but does have significant watering in the AM.  Denies flashes/floaters. Light sensitivity is resolved but he does have left eye distortion.       POHx:  OGR OS 2008  PPV/SBP OS  2008 (JT)  Sutured 18.0 D CZ70BD  OS  2008 (Emperatriz)  S/p lens explantation/scleral fixated IOL (Yamane)/pupilloplasty OS (10/8/21).     CTL wearer: no  Glasses: yes    Family Hx of eye disease: no    Gtts Currenly Taking:  AT's PM at bedtime, left eye   Prednisolone TID left eye  Cosopt BID left eye     Allergies:  Cefprozil (hives)  PCN (Rash)  Hydrocodone (don't remember)    OCT Mac (1/3/22)  No CME OS    A/P:    # Dislocated IOL left eye: appears to still be attached in the nasal aspect  - s/p IOL explantation/scleral fixated IOL (Yamane)/pupilloplasty (10/8/21)  Doing well.  IOP is within normal limits today w/o gtts.  - 2+ inflammation present on exam.  Possibly from the inferior lens edge but no iris movement.  - Discussed return precautions, activity limitations, and drop regimen    # Traumatic mydriasis and corectopia left eye     # No angle closure or narrow angle right eye  - Observe for now.    PLAN:  - Continue Pred Forte taper to BID x 2 weeks, then reduce to daily x 2 weeks then reassess   - Continue Cosopt BID left eye   - Monitor IOP with steroid   - MRx provided today     - F/u as scheduled with Dr. Thompson    Follow up Cornea:  4 weeks with Cornea- Needs vision, IOP, and  anterior chamber check     ALSO SEES:  Dr. Thompson     Attending Physician Attestation:  Complete documentation of historical and exam elements from today's encounter can be found in the full encounter summary report (not reduplicated in this progress note).  I personally obtained the chief complaint(s) and history of present illness.  I confirmed and edited as necessary the review of systems, past medical/surgical history, family history, social history, and examination findings as documented by others; and I examined the patient myself.   I formulated and edited as necessary the assessment and plan and discussed the findings and management plan with the patient and family.     Stefan Jones, DO  Cornea Fellow

## 2022-02-07 NOTE — NURSING NOTE
Chief Complaints and History of Present Illnesses   Patient presents with     Follow Up     anterior chamber check      Chief Complaint(s) and History of Present Illness(es)     Follow Up     Laterality: left eye    Associated symptoms: Negative for eye pain, headache, flashes and floaters    Treatments tried: eye drops    Pain scale: 0/10    Comments: anterior chamber check               Comments     Pt states no change in VA since last visit    Ebony WILSON 7:45 AM February 7, 2022

## 2022-03-11 ENCOUNTER — OFFICE VISIT (OUTPATIENT)
Dept: OPHTHALMOLOGY | Facility: CLINIC | Age: 62
End: 2022-03-11
Attending: OPHTHALMOLOGY
Payer: OTHER MISCELLANEOUS

## 2022-03-11 DIAGNOSIS — Z96.1 PSEUDOPHAKIA: ICD-10-CM

## 2022-03-11 DIAGNOSIS — Z98.890 HISTORY OF RUPTURED GLOBE REPAIR: ICD-10-CM

## 2022-03-11 DIAGNOSIS — T85.22XA DISLOCATED IOL (INTRAOCULAR LENS), POSTERIOR, LEFT: Primary | ICD-10-CM

## 2022-03-11 DIAGNOSIS — H35.372 EPIRETINAL MEMBRANE (ERM) OF LEFT EYE: ICD-10-CM

## 2022-03-11 PROCEDURE — G0463 HOSPITAL OUTPT CLINIC VISIT: HCPCS

## 2022-03-11 PROCEDURE — 99214 OFFICE O/P EST MOD 30 MIN: CPT | Mod: GC | Performed by: OPHTHALMOLOGY

## 2022-03-11 ASSESSMENT — SLIT LAMP EXAM - LIDS
COMMENTS: NORMAL
COMMENTS: NORMAL

## 2022-03-11 ASSESSMENT — REFRACTION_WEARINGRX
OD_ADD: +2.50
OD_CYLINDER: +0.50
OS_ADD: +2.50
OD_SPHERE: +0.50
OS_CYLINDER: +1.75
OS_SPHERE: -2.00
OD_AXIS: 008
OS_AXIS: 055

## 2022-03-11 ASSESSMENT — EXTERNAL EXAM - LEFT EYE: OS_EXAM: NORMAL

## 2022-03-11 ASSESSMENT — CONF VISUAL FIELD
OS_NORMAL: 1
OD_NORMAL: 1

## 2022-03-11 ASSESSMENT — VISUAL ACUITY
METHOD: SNELLEN - LINEAR
CORRECTION_TYPE: GLASSES
OS_PH_CC: 20/25
OS_CC: 20/40
OD_CC: 20/15
OS_PH_CC+: -2

## 2022-03-11 ASSESSMENT — EXTERNAL EXAM - RIGHT EYE: OD_EXAM: NORMAL

## 2022-03-11 ASSESSMENT — TONOMETRY
OS_IOP_MMHG: 17
OD_IOP_MMHG: 11
IOP_METHOD: TONOPEN

## 2022-03-11 NOTE — NURSING NOTE
Chief Complaints and History of Present Illnesses   Patient presents with     Follow Up     Chief Complaint(s) and History of Present Illness(es)     Follow Up     Laterality: left eye    Associated symptoms: eye pain and tearing.  Negative for flashes and floaters    Pain scale: 2/10              Comments     4 week follow up LE  Pain at times LE level 2  Refresh q2h   Refresh pm at night LE  pred forte every day LE  cosopt bid ISIDRO Hernández COA 9:05 AM March 11, 2022

## 2022-03-11 NOTE — PATIENT INSTRUCTIONS
Decrease prednisolone (PINK top) to every other day - left eye  Decrease Dorzolamide-timolol (DARK BLUE top) to every morning - left eye

## 2022-03-11 NOTE — PROGRESS NOTES
CC: Dislocated IOL LE, Loss of vision since 9/6/21, seen by on call, diagnosed with dislocated IOL now s/p Scleral fixed IOL left eye     Referring Provider: Dr Katie Thompson    HPI:    Joce Maier 61 year old White male patient presenting for h/o OGR left eye after injury with metal pin for concrete construction.  He is referred today from Dr. Thompson for a left IOL dislocation and concern for a narrow angle on the right eye.      Interval history: s/p IOL explantation/scleral fixated IOL (Yamane)/pupilloplasty (10/8/21). Vision stable since last visit. No pain. Intermittent watery discharge.      POHx:  OGR OS 2008  PPV/SBP OS  2008 (BRIA)  Sutured 18.0 D CZ70BD  OS  2008 (Emperatriz)  S/p lens explantation/scleral fixated IOL (Yamane)/pupilloplasty OS (10/8/21).     CTL wearer: no  Glasses: yes    Family Hx of eye disease: no    Gtts Currently Taking:  AT's PRN, left eye    Cosopt BID, left eye   PF daily, left eye     Allergies:  Cefprozil (hives)  PCN (Rash)  Hydrocodone (don't remember)    OCT Mac (1/3/22)  No CME OS    Assessment:    # S/p IOL explantation/scleral fixated IOL (Yamane)/pupilloplasty (10/8/21)  # Decentered scleral fixated IOL  Doing well.  IOP is within normal limits today.  - Discussed return precautions, activity limitations, and drop regimen  - MRx provided last visit  - No CME on last OCT mac  - discussed inferior iris-lens touch - unlikely to be a major contributor to inflammation given extensive scarring of iris and no iris movement.    # Traumatic mydriasis and corectopia left eye     # No angle closure or narrow angle right eye  - Observe for now.    PLAN:  - Decrease PF to every other day, left eye   - Decrease Cosopt to qAM, left eye   - Increase AT to QID, left eye     - F/u as scheduled with Dr. Thompson    Follow up Cornea: 2 months with VT; sooner if changes    ALSO SEES:  Dr. Donna Watt MD  Ophthalmology Resident, PGY-3    Attending Physician  Attestation:  Complete documentation of historical and exam elements from today's encounter can be found in the full encounter summary report (not reduplicated in this progress note).  I personally obtained the chief complaint(s) and history of present illness.  I confirmed and edited as necessary the review of systems, past medical/surgical history, family history, social history, and examination findings as documented by others; and I examined the patient myself.  I personally reviewed the relevant tests, images, and reports as documented above.  I formulated and edited as necessary the assessment and plan and discussed the findings and management plan with the patient and family. - Faustino Williamson MD    I personally spent great than 40min with the patient, of which >50% of the time was spent face to face with the patient, counseling and coordinating care with the patient. We discussed the complexity of his diagnosis, the need for further information prior to proceeding with yet another surgery, and the unknown prognosis for the patient at this time.    Faustino Williamson MD

## 2022-04-07 DIAGNOSIS — H35.372 EPIRETINAL MEMBRANE (ERM) OF LEFT EYE: Primary | ICD-10-CM

## 2022-04-19 ENCOUNTER — OFFICE VISIT (OUTPATIENT)
Dept: OPHTHALMOLOGY | Facility: CLINIC | Age: 62
End: 2022-04-19
Attending: OPHTHALMOLOGY
Payer: OTHER MISCELLANEOUS

## 2022-04-19 DIAGNOSIS — H40.052 BORDERLINE GLAUCOMA OF LEFT EYE WITH OCULAR HYPERTENSION: Primary | ICD-10-CM

## 2022-04-19 DIAGNOSIS — H35.372 EPIRETINAL MEMBRANE (ERM) OF LEFT EYE: ICD-10-CM

## 2022-04-19 PROCEDURE — G0463 HOSPITAL OUTPT CLINIC VISIT: HCPCS | Mod: 25

## 2022-04-19 PROCEDURE — 99213 OFFICE O/P EST LOW 20 MIN: CPT | Performed by: OPHTHALMOLOGY

## 2022-04-19 PROCEDURE — 92134 CPTRZ OPH DX IMG PST SGM RTA: CPT | Performed by: OPHTHALMOLOGY

## 2022-04-19 PROCEDURE — 92250 FUNDUS PHOTOGRAPHY W/I&R: CPT | Performed by: OPHTHALMOLOGY

## 2022-04-19 RX ORDER — TIMOLOL MALEATE 2.5 MG/ML
1 SOLUTION/ DROPS OPHTHALMIC EVERY MORNING
Qty: 5 ML | Refills: 11 | Status: SHIPPED | OUTPATIENT
Start: 2022-04-19

## 2022-04-19 ASSESSMENT — SLIT LAMP EXAM - LIDS
COMMENTS: NORMAL
COMMENTS: NORMAL

## 2022-04-19 ASSESSMENT — REFRACTION_WEARINGRX
OD_ADD: +2.50
OS_ADD: +2.50
OD_AXIS: 008
OS_CYLINDER: +1.75
OD_CYLINDER: +0.50
OS_AXIS: 055
OD_SPHERE: +0.50
OS_SPHERE: -2.00

## 2022-04-19 ASSESSMENT — VISUAL ACUITY
OS_CC: 20/40
OD_CC+: -1
CORRECTION_TYPE: GLASSES
METHOD: SNELLEN - LINEAR
OD_CC: 20/15
OS_CC+: -1

## 2022-04-19 ASSESSMENT — TONOMETRY
IOP_METHOD: TONOPEN
OS_IOP_MMHG: 14
OD_IOP_MMHG: 14

## 2022-04-19 ASSESSMENT — CUP TO DISC RATIO: OS_RATIO: 0.2

## 2022-04-19 ASSESSMENT — CONF VISUAL FIELD
METHOD: COUNTING FINGERS
OS_NORMAL: 1
OD_NORMAL: 1

## 2022-04-19 ASSESSMENT — EXTERNAL EXAM - LEFT EYE: OS_EXAM: NORMAL

## 2022-04-19 ASSESSMENT — EXTERNAL EXAM - RIGHT EYE: OD_EXAM: NORMAL

## 2022-04-19 NOTE — NURSING NOTE
Chief Complaints and History of Present Illnesses   Patient presents with     Follow Up     Chief Complaint(s) and History of Present Illness(es)     Follow Up     Laterality: left eye    Quality: blurred vision    Course: stable    Associated symptoms: tearing.  Negative for eye pain, flashes and floaters    Pain scale: 0/10              Comments     3.5 month follow up s/p PPV/IOL explant/Thad, left eye 10/8/21 .  PT denies any significant vision changes LE since last visit.  Complains of LE tearing oftenn  Denies any flashes, floaters, pain, or irritation.  Ocular meds: Cosopt daily LE & Prednisolone once every other day LE    Marilyn Fernandez OT 9:20 AM April 19, 2022

## 2022-04-19 NOTE — PROGRESS NOTES
CC -   H/o OGR OS    INTERVAL HISTORY -  Using PF every other other day OS, using Cosopt 1/day OS  Declines dilation OD today, will get Optos instead    Last DFE OD 6/2021    PMH -     Joce Maier is a 61 year old  patient withh/o OGR left eye after injury with metal pin for concrete construction in 2008.  Had sutured IOL placed 2008, dislocated in 2021 with exchange done 10/2021      PAST OCULAR SURGERY  OGR OS 2008  PPV/SBP OS  2008 (JT)  Sutured 18.0 D CZ70BD  OS  2008 (Emperatriz)  PPV/IOL explant/Yamane OS 10/8/21 (DDK+Teri)      RETINAL MAGING  OCT 04/19/22   OD- normal retina, PHF attached  OS- tr ERM, mild outer retina irregularity nasally; stable    [facility] 1-3-22  OS - optic-iris touch inferior upright & supine position    OCT RNFL 4/19/22  OD - sector borderline  OS - IN sector abnormal      ASSESSMENT & PLAN      # optic-iris touch OS   - after Yamane 10/2021   - seen on [facility] 1-3-22   - mild rebound inflammation 1-3-22 restarted PF TID now on every other day     - 4/19/22 quiet on PF every other day   - CPM      # OAG suspect   - IOP good on cosopt qAM   - change to timolol 0.25 qAM   - OCT RNFL abnormal 4/19/22   - IOP good, CDR small   - monitor        #.  H/o OGR left eye 2008   - s/p AC  reconstrcuction and sutured lens (Awan)   - s/p Pars plana vitrectomy (PPV)/SBP (JT)   - s/p PPV/IOL explant/Yamane 10/8/21 (DDK)          #.  vitreous syneresis right eye   - advised S/Sx Retinal detachment  12/2021    #. Tr NS OD   - observe      #  Tr ERM OS   - not visually significant,    #. H/o Hollenhorst plaque and peripheral IRH OS   - noted 7/2017 IT arcade   - had w/u by PCP normal 2017 (carotid & TTE)        return to clinic:  6 months, OCT OU, DFE OU,      ATTESTATION     Attending Physician Attestation:      Complete documentation of historical and exam elements from today's encounter can be found in the full encounter summary report (not reduplicated in this progress note).  I personally obtained the  chief complaint(s) and history of present illness.  I confirmed and edited as necessary the review of systems, past medical/surgical history, family history, social history, and examination findings as documented by others; and I examined the patient myself.  I personally reviewed the relevant tests, images, and reports as documented above.  I formulated and edited as necessary the assessment and plan and discussed the findings and management plan with the patient and family    Katie Thompson MD, PhD  , Vitreoretinal Surgery  Department of Ophthalmology  [facility]

## 2022-06-10 ENCOUNTER — OFFICE VISIT (OUTPATIENT)
Dept: OPHTHALMOLOGY | Facility: CLINIC | Age: 62
End: 2022-06-10
Attending: OPHTHALMOLOGY
Payer: OTHER MISCELLANEOUS

## 2022-06-10 DIAGNOSIS — Z96.1 PSEUDOPHAKIA: Primary | ICD-10-CM

## 2022-06-10 PROCEDURE — 99215 OFFICE O/P EST HI 40 MIN: CPT | Mod: GC | Performed by: OPHTHALMOLOGY

## 2022-06-10 PROCEDURE — G0463 HOSPITAL OUTPT CLINIC VISIT: HCPCS

## 2022-06-10 ASSESSMENT — TONOMETRY
OS_IOP_MMHG: 15
OD_IOP_MMHG: 15
IOP_METHOD: ICARE

## 2022-06-10 ASSESSMENT — REFRACTION_WEARINGRX
OD_AXIS: 008
OD_CYLINDER: +0.50
OS_ADD: +2.50
OS_SPHERE: -2.00
OS_AXIS: 055
OD_ADD: +2.50
OS_CYLINDER: +1.75
OD_SPHERE: +0.50

## 2022-06-10 ASSESSMENT — SLIT LAMP EXAM - LIDS
COMMENTS: NORMAL
COMMENTS: NORMAL

## 2022-06-10 ASSESSMENT — VISUAL ACUITY
OS_SC+: -2
OS_SC: 20/30
METHOD: SNELLEN - LINEAR
CORRECTION_TYPE: GLASSES
OD_SC+: -1
OD_SC: 20/15

## 2022-06-10 ASSESSMENT — EXTERNAL EXAM - LEFT EYE: OS_EXAM: NORMAL

## 2022-06-10 ASSESSMENT — CONF VISUAL FIELD
OS_NORMAL: 1
OD_NORMAL: 1

## 2022-06-10 ASSESSMENT — CUP TO DISC RATIO: OS_RATIO: 0.2

## 2022-06-10 ASSESSMENT — EXTERNAL EXAM - RIGHT EYE: OD_EXAM: NORMAL

## 2022-06-10 NOTE — NURSING NOTE
Chief Complaints and History of Present Illnesses   Patient presents with     Follow Up     Pt here for follow up on s/p IOL explantation/scleral fixated IOL (Yamane)/pupilloplasty (10/8/21).      Chief Complaint(s) and History of Present Illness(es)     Follow Up     Laterality: left eye    Associated symptoms: Negative for eye pain, headache, flashes and floaters    Comments: Pt here for follow up on s/p IOL explantation/scleral fixated IOL (Yamane)/pupilloplasty (10/8/21).               Comments     Pt notes vision is stable since last exam.   Pt using:  -PF to every other day, left eye   - timolol - changed from Cosopt by Dr Donna MILIAN, COA 7:50 AM Madeline 10, 2022

## 2022-06-10 NOTE — PROGRESS NOTES
CC: Dislocated IOL LE, Loss of vision since 9/6/21, seen by on call, diagnosed with dislocated IOL now s/p Scleral fixed IOL left eye     Referring Provider: Dr Katie Thompson    HPI:    Joce Maier 62 year old White male patient presenting for h/o OGR left eye after injury with metal pin for concrete construction.  He is referred today from Dr. Thompson for a left IOL dislocation and concern for a narrow angle on the right eye.      Interval history: s/p IOL explantation/scleral fixated IOL (Yamane)/pupilloplasty (10/8/21). Pt notes the left eye feels good.  Vision is stable.  He was switched from Cosopt BID to Timolol daily last visit with Dr. Baca and he notes his eye feels better with just timolol.      POHx:  OGR OS 2008  PPV/SBP OS  2008 (JT)  Sutured 18.0 D CZ70BD  OS  2008 (Emperatriz)  S/p lens explantation/scleral fixated IOL (Yamane)/pupilloplasty OS (10/8/21).     CTL wearer: no  Glasses: yes    Family Hx of eye disease: no    Gtts Currently Taking:  AT's PRN, left eye    Timolol QAM, left eye   Prednisolone every other day, left eye     Allergies:  Cefprozil (hives)  PCN (Rash)  Hydrocodone (don't remember)    OCT Mac (1/3/22)  No CME OS    Assessment:    # S/p IOL explantation/scleral fixated IOL (Yamane)/pupilloplasty (10/8/21)  # Decentered scleral fixated IOL  Doing well.  IOP is within normal limits today.  - Discussed return precautions, activity limitations, and drop regimen  - MRx provided last visit  - No CME on last OCT mac (4/2022)- discussed inferior iris-lens touch - unlikely to be a major contributor to inflammation given extensive scarring of iris and no iris movement.    # Traumatic mydriasis and corectopia left eye     # No angle closure or narrow angle right eye  - Observe for now.    PLAN:  - D/C PF every other day, left eye   - Continue timolol QAM, left eye - if IOP normal at follow-up with Dr. Thompson, okay to STOP   - Increase AT to QID, left eye   - F/u as scheduled  with Dr. Thompson    Follow up Cornea: 6 months with VT; sooner if changes    ALSO SEES:  Dr. Donna Jones, DO  Fellow, Cornea & External Disease  Department of Ophthalmology  [facility]    Attending Physician Attestation:  Complete documentation of historical and exam elements from today's encounter can be found in the full encounter summary report (not reduplicated in this progress note).  I personally obtained the chief complaint(s) and history of present illness.  I confirmed and edited as necessary the review of systems, past medical/surgical history, family history, social history, and examination findings as documented by others; and I examined the patient myself.  I personally reviewed the relevant tests, images, and reports as documented above.  I formulated and edited as necessary the assessment and plan and discussed the findings and management plan with the patient and family. - Faustino Williamson MD    I personally spent great than 40min with the patient, of which >50% of the time was spent face to face with the patient, counseling and coordinating care with the patient. We discussed the complexity of his diagnosis, the need for further information prior to proceeding with yet another surgery, and the unknown prognosis for the patient at this time.    Faustino Williamson MD

## 2022-09-13 ENCOUNTER — TELEPHONE (OUTPATIENT)
Dept: OPHTHALMOLOGY | Facility: CLINIC | Age: 62
End: 2022-09-13

## 2022-09-26 ENCOUNTER — TELEPHONE (OUTPATIENT)
Dept: OPHTHALMOLOGY | Facility: CLINIC | Age: 62
End: 2022-09-26

## 2022-10-14 DIAGNOSIS — H35.372 EPIRETINAL MEMBRANE (ERM) OF LEFT EYE: Primary | ICD-10-CM

## 2022-10-31 ENCOUNTER — OFFICE VISIT (OUTPATIENT)
Dept: OPHTHALMOLOGY | Facility: CLINIC | Age: 62
End: 2022-10-31
Attending: OPHTHALMOLOGY
Payer: OTHER MISCELLANEOUS

## 2022-10-31 DIAGNOSIS — H35.372 EPIRETINAL MEMBRANE (ERM) OF LEFT EYE: ICD-10-CM

## 2022-10-31 PROCEDURE — G0463 HOSPITAL OUTPT CLINIC VISIT: HCPCS | Mod: 25

## 2022-10-31 PROCEDURE — 92134 CPTRZ OPH DX IMG PST SGM RTA: CPT | Performed by: OPHTHALMOLOGY

## 2022-10-31 PROCEDURE — 99214 OFFICE O/P EST MOD 30 MIN: CPT | Performed by: OPHTHALMOLOGY

## 2022-10-31 ASSESSMENT — VISUAL ACUITY
OS_CC: 20/40
OS_PH_CC+: -1
CORRECTION_TYPE: GLASSES
OD_CC+: +1
OD_CC: 20/20
METHOD: SNELLEN - LINEAR
OS_PH_CC: 20/30

## 2022-10-31 ASSESSMENT — CONF VISUAL FIELD
OD_NORMAL: 1
OS_NORMAL: 1
OS_SUPERIOR_TEMPORAL_RESTRICTION: 0
OS_INFERIOR_TEMPORAL_RESTRICTION: 0
OD_SUPERIOR_TEMPORAL_RESTRICTION: 0
METHOD: COUNTING FINGERS
OS_INFERIOR_NASAL_RESTRICTION: 0
OD_INFERIOR_TEMPORAL_RESTRICTION: 0
OS_SUPERIOR_NASAL_RESTRICTION: 0
OD_SUPERIOR_NASAL_RESTRICTION: 0
OD_INFERIOR_NASAL_RESTRICTION: 0

## 2022-10-31 ASSESSMENT — REFRACTION_WEARINGRX
OS_CYLINDER: +1.75
OS_SPHERE: -2.00
OS_ADD: +2.50
OD_ADD: +2.50
OD_AXIS: 008
OD_SPHERE: +0.50
OD_CYLINDER: +0.50
OS_AXIS: 055

## 2022-10-31 ASSESSMENT — SLIT LAMP EXAM - LIDS
COMMENTS: NORMAL
COMMENTS: NORMAL

## 2022-10-31 ASSESSMENT — EXTERNAL EXAM - RIGHT EYE: OD_EXAM: NORMAL

## 2022-10-31 ASSESSMENT — CUP TO DISC RATIO
OD_RATIO: 0.2
OS_RATIO: 0.2

## 2022-10-31 ASSESSMENT — TONOMETRY
OD_IOP_MMHG: 14
IOP_METHOD: TONOPEN
OS_IOP_MMHG: 13

## 2022-10-31 ASSESSMENT — EXTERNAL EXAM - LEFT EYE: OS_EXAM: NORMAL

## 2022-10-31 NOTE — NURSING NOTE
Chief Complaints and History of Present Illnesses   Patient presents with     Follow Up     6 month follow up vitreous syneresis right eye     Chief Complaint(s) and History of Present Illness(es)     Follow Up            Comments: 6 month follow up vitreous syneresis right eye          Comments    Pt states vision is the same as last visit. No eye pain today no flashes or floaters.  No new redness or dryness.    ASHER Montez October 31, 2022 7:25 AM

## 2022-10-31 NOTE — PROGRESS NOTES
CC -   H/o OGR OS    INTERVAL HISTORY -  Using timolol 0.25% qAM OS (stopped PF 6/2022)    PMH -     Joce Maier is a 62 year old  patient withh/o OGR left eye after injury with metal pin for concrete construction in 2008.  Had sutured IOL placed 2008, dislocated in 2021 with exchange done 10/2021      PAST OCULAR SURGERY  OGR OS 2008  PPV/SBP OS  2008 (JT)  Sutured 18.0 D CZ70BD  OS  2008 (Awan)  PPV/IOL explant/Yamane OS 10/8/21 (DDK+Teri)      RETINAL MAGING  OCT 10/31/22    OD- normal retina, PHF attached  OS- tr ERM, mild outer retina irregularity nasally; stable    UBM 1-3-22  OS - optic-iris touch inferior upright & supine position    OCT RNFL 4/19/22  OD - sector borderline  OS - IN sector abnormal      ASSESSMENT & PLAN      # optic-iris touch OS   - after Yamane 10/2021   - seen on UBM 1-3-22   - mild rebound inflammation 1-3-22 restarted PF TID now on every other day     - 4/19/22 quiet on PF every other day   - stopped PF 6/2022   - 10/31/22 no inflammation or CME off gtts     - CPM      # OAG suspect   - IOP good on cosopt qAM   - change to timolol 0.25 qAM   - OCT RNFL abnormal 4/19/22   - IOP good, CDR small      - stop timolol today 10/31/22   - will f/u with Teri 12/2022        #.  H/o OGR left eye 2008   - s/p AC  reconstrcuction and sutured lens (Awan)   - s/p Pars plana vitrectomy (PPV)/SBP (JT)   - s/p PPV/IOL explant/Yamane 10/8/21 (DDK)          #.  vitreous syneresis right eye   - advised S/Sx Retinal detachment  10/2022    #. Tr NS OD   - observe      #  Tr ERM OS   - not visually significant,    #. H/o Hollenhorst plaque and peripheral IRH OS   - noted 7/2017 IT arcade   - had w/u by PCP normal 2017 (carotid & TTE)        return to clinic:  1 year, OCT OU, DFE OU,      ATTESTATION     Attending Physician Attestation:      Complete documentation of historical and exam elements from today's encounter can be found in the full encounter summary report (not reduplicated in this progress  note).  I personally obtained the chief complaint(s) and history of present illness.  I confirmed and edited as necessary the review of systems, past medical/surgical history, family history, social history, and examination findings as documented by others; and I examined the patient myself.  I personally reviewed the relevant tests, images, and reports as documented above.  I formulated and edited as necessary the assessment and plan and discussed the findings and management plan with the patient and family    Katie Thompson MD, PhD  , Vitreoretinal Surgery  Department of Ophthalmology  [facility]

## 2022-12-12 ENCOUNTER — OFFICE VISIT (OUTPATIENT)
Dept: OPHTHALMOLOGY | Facility: CLINIC | Age: 62
End: 2022-12-12
Attending: OPHTHALMOLOGY
Payer: OTHER MISCELLANEOUS

## 2022-12-12 DIAGNOSIS — T85.22XA DISLOCATED IOL (INTRAOCULAR LENS), POSTERIOR, LEFT: Primary | ICD-10-CM

## 2022-12-12 DIAGNOSIS — H35.372 EPIRETINAL MEMBRANE (ERM) OF LEFT EYE: ICD-10-CM

## 2022-12-12 DIAGNOSIS — Z96.1 PSEUDOPHAKIA: ICD-10-CM

## 2022-12-12 PROCEDURE — 99215 OFFICE O/P EST HI 40 MIN: CPT | Performed by: OPHTHALMOLOGY

## 2022-12-12 PROCEDURE — G0463 HOSPITAL OUTPT CLINIC VISIT: HCPCS

## 2022-12-12 ASSESSMENT — VISUAL ACUITY
OS_CC: 20/40
CORRECTION_TYPE: GLASSES
OD_CC+: -2
METHOD: SNELLEN - LINEAR
OD_CC: 20/15
OS_PH_CC: 20/40
OS_CC+: -2

## 2022-12-12 ASSESSMENT — CUP TO DISC RATIO: OS_RATIO: 0.2

## 2022-12-12 ASSESSMENT — EXTERNAL EXAM - LEFT EYE: OS_EXAM: NORMAL

## 2022-12-12 ASSESSMENT — CONF VISUAL FIELD
OS_SUPERIOR_TEMPORAL_RESTRICTION: 0
OS_INFERIOR_TEMPORAL_RESTRICTION: 0
OD_SUPERIOR_NASAL_RESTRICTION: 0
OS_NORMAL: 1
OS_INFERIOR_NASAL_RESTRICTION: 0
METHOD: COUNTING FINGERS
OS_SUPERIOR_NASAL_RESTRICTION: 0
OD_INFERIOR_TEMPORAL_RESTRICTION: 0
OD_INFERIOR_NASAL_RESTRICTION: 0
OD_NORMAL: 1
OD_SUPERIOR_TEMPORAL_RESTRICTION: 0

## 2022-12-12 ASSESSMENT — REFRACTION_WEARINGRX
OD_CYLINDER: +0.50
OS_AXIS: 055
OS_ADD: +2.50
OD_ADD: +2.50
OS_CYLINDER: +1.75
OD_SPHERE: +0.50
OD_AXIS: 008
OS_SPHERE: -2.00

## 2022-12-12 ASSESSMENT — TONOMETRY
OD_IOP_MMHG: 11
IOP_METHOD: ICARE
OS_IOP_MMHG: 17

## 2022-12-12 ASSESSMENT — SLIT LAMP EXAM - LIDS
COMMENTS: NORMAL
COMMENTS: NORMAL

## 2022-12-12 ASSESSMENT — EXTERNAL EXAM - RIGHT EYE: OD_EXAM: NORMAL

## 2022-12-12 NOTE — NURSING NOTE
Chief Complaints and History of Present Illnesses   Patient presents with     Follow Up     6 month follow up S/p IOL explantation/scleral fixated IOL (Yamane)/pupilloplasty (10/8/21)     Chief Complaint(s) and History of Present Illness(es)     Follow Up            Comments: 6 month follow up S/p IOL explantation/scleral fixated IOL (Yamane)/pupilloplasty (10/8/21)          Comments    Pt states vision has been good since last visit. Pt stopped timolol at the end of October.  No eye pain today. No flashes or floaters. No redness or dryness.    ASHER Montez December 12, 2022 7:10 AM

## 2022-12-12 NOTE — PROGRESS NOTES
CC: Dislocated IOL LE, Loss of vision since 9/6/21, seen by on call, diagnosed with dislocated IOL now s/p Scleral fixed IOL left eye     Referring Provider: Dr Katie Thompson    HPI:    Joce Maier 62 year old White male patient presenting for h/o OGR left eye after injury with metal pin for concrete construction.  He is referred today from Dr. Thompson for a left IOL dislocation and concern for a narrow angle on the right eye.      Interval history: s/p IOL explantation/scleral fixated IOL (Yamane)/pupilloplasty (10/8/21). Pt notes the left eye feels good.  Vision is stable.  He was switched from Cosopt BID to Timolol daily last visit with Dr. Baca and he notes his eye feels better with just timolol.      POHx:  OGR OS 2008  PPV/SBP OS  2008 (JT)  Sutured 18.0 D CZ70BD  OS  2008 (Emperatriz)  S/p lens explantation/scleral fixated IOL (Yamane)/pupilloplasty OS (10/8/21).     CTL wearer: no  Glasses: yes    Family Hx of eye disease: no    Gtts Currently Taking:  AT's PRN, left eye      Allergies:  Cefprozil (hives)  PCN (Rash)  Hydrocodone (don't remember)    OCT Mac (1/3/22)  No CME OS    Assessment:    # S/p IOL explantation/scleral fixated IOL (Yamane)/pupilloplasty (10/8/21)  # Decentered scleral fixated IOL  Doing well.  IOP is within normal limits today.  - Discussed return precautions, activity limitations, and drop regimen  - MRx provided last visit  - No CME on last OCT mac (4/2022)- discussed inferior iris-lens touch - unlikely to be a major contributor to inflammation given extensive scarring of iris and no iris movement.    # Traumatic mydriasis and corectopia left eye     # No angle closure or narrow angle right eye  - Observe for now.    PLAN:  - Acceptable IOP off timolol  - Increase AT to QID, left eye   - discussed artificial iris implant - would defer for now  - F/u as scheduled with Dr. Thompson    Follow up Cornea: 6 months with VT; sooner if changes    ALSO SEES:  Dr. Thompson      Attending Physician Attestation:  Complete documentation of historical and exam elements from today's encounter can be found in the full encounter summary report (not reduplicated in this progress note).  I personally obtained the chief complaint(s) and history of present illness.  I confirmed and edited as necessary the review of systems, past medical/surgical history, family history, social history, and examination findings as documented by others; and I examined the patient myself.  I personally reviewed the relevant tests, images, and reports as documented above.  I formulated and edited as necessary the assessment and plan and discussed the findings and management plan with the patient and family. - Faustino Williamson MD    I personally spent great than 40minutes spent on the date of the encounter doing chart review, history and exam, discussion with the patient, documentation, and further activities as noted above. We discussed the complexity of their diagnosis, the need for further information, close monitoring, continued management, and the unknown prognosis for the patient at this time.    Faustino Williamson MD

## 2023-04-22 ENCOUNTER — HEALTH MAINTENANCE LETTER (OUTPATIENT)
Age: 63
End: 2023-04-22

## 2023-05-26 DIAGNOSIS — H35.372 EPIRETINAL MEMBRANE (ERM) OF LEFT EYE: Primary | ICD-10-CM

## 2023-06-12 ENCOUNTER — OFFICE VISIT (OUTPATIENT)
Dept: OPHTHALMOLOGY | Facility: CLINIC | Age: 63
End: 2023-06-12
Attending: OPHTHALMOLOGY
Payer: OTHER MISCELLANEOUS

## 2023-06-12 DIAGNOSIS — H35.372 EPIRETINAL MEMBRANE (ERM) OF LEFT EYE: ICD-10-CM

## 2023-06-12 DIAGNOSIS — Z96.1 PSEUDOPHAKIA: ICD-10-CM

## 2023-06-12 DIAGNOSIS — T85.22XA DISLOCATED IOL (INTRAOCULAR LENS), POSTERIOR, LEFT: Primary | ICD-10-CM

## 2023-06-12 DIAGNOSIS — H40.052 BORDERLINE GLAUCOMA OF LEFT EYE WITH OCULAR HYPERTENSION: Primary | ICD-10-CM

## 2023-06-12 DIAGNOSIS — H25.11 SENILE NUCLEAR SCLEROSIS, RIGHT: ICD-10-CM

## 2023-06-12 PROCEDURE — G0463 HOSPITAL OUTPT CLINIC VISIT: HCPCS | Mod: 27 | Performed by: OPHTHALMOLOGY

## 2023-06-12 PROCEDURE — 92134 CPTRZ OPH DX IMG PST SGM RTA: CPT | Performed by: OPHTHALMOLOGY

## 2023-06-12 PROCEDURE — 999N000103 HC STATISTIC NO CHARGE FACILITY FEE

## 2023-06-12 PROCEDURE — 92133 CPTRZD OPH DX IMG PST SGM ON: CPT | Performed by: OPHTHALMOLOGY

## 2023-06-12 PROCEDURE — 92015 DETERMINE REFRACTIVE STATE: CPT

## 2023-06-12 PROCEDURE — 99214 OFFICE O/P EST MOD 30 MIN: CPT | Mod: 25 | Performed by: OPHTHALMOLOGY

## 2023-06-12 PROCEDURE — G0463 HOSPITAL OUTPT CLINIC VISIT: HCPCS | Performed by: OPHTHALMOLOGY

## 2023-06-12 PROCEDURE — 99214 OFFICE O/P EST MOD 30 MIN: CPT | Mod: GC | Performed by: OPHTHALMOLOGY

## 2023-06-12 PROCEDURE — 99207 OCT OPTIC NERVE RNFL SPECTRALIS OU (BOTH EYES): CPT | Mod: 26 | Performed by: OPHTHALMOLOGY

## 2023-06-12 ASSESSMENT — REFRACTION_MANIFEST
OS_AXIS: 065
OS_ADD: +2.50
OD_CYLINDER: +0.50
OD_CYLINDER: +0.50
OS_SPHERE: -0.50
OS_CYLINDER: +0.25
OD_SPHERE: +0.25
OD_ADD: +2.50
OD_ADD: +2.50
OS_ADD: +2.50
OS_AXIS: 065
OS_SPHERE: -0.50
OS_CYLINDER: +0.25
OD_SPHERE: +0.25
OD_AXIS: 180
OD_AXIS: 180

## 2023-06-12 ASSESSMENT — VISUAL ACUITY
OS_CC: 20/50
OD_CC+: -1
CORRECTION_TYPE: GLASSES
OD_CC: 20/15
OS_PH_CC: 20/40
OD_CC+: -2
METHOD: SNELLEN - LINEAR
OS_CC: 20/50
OD_CC: 20/15
METHOD: SNELLEN - LINEAR
OS_PH_CC: 20/40

## 2023-06-12 ASSESSMENT — TONOMETRY
IOP_METHOD: TONOPEN
OD_IOP_MMHG: 14
OS_IOP_MMHG: 19
OD_IOP_MMHG: 14
IOP_METHOD: TONOPEN
OS_IOP_MMHG: 19

## 2023-06-12 ASSESSMENT — CONF VISUAL FIELD
OS_INFERIOR_NASAL_RESTRICTION: 0
OD_SUPERIOR_NASAL_RESTRICTION: 0
OS_INFERIOR_TEMPORAL_RESTRICTION: 0
OS_SUPERIOR_TEMPORAL_RESTRICTION: 0
OD_INFERIOR_NASAL_RESTRICTION: 0
OS_INFERIOR_NASAL_RESTRICTION: 0
OS_SUPERIOR_TEMPORAL_RESTRICTION: 0
OS_SUPERIOR_NASAL_RESTRICTION: 0
OD_SUPERIOR_TEMPORAL_RESTRICTION: 0
METHOD: COUNTING FINGERS
OS_SUPERIOR_NASAL_RESTRICTION: 0
OS_NORMAL: 1
OS_INFERIOR_TEMPORAL_RESTRICTION: 0
OD_NORMAL: 1
OD_INFERIOR_TEMPORAL_RESTRICTION: 0
OS_NORMAL: 1

## 2023-06-12 ASSESSMENT — SLIT LAMP EXAM - LIDS
COMMENTS: NORMAL

## 2023-06-12 ASSESSMENT — REFRACTION_WEARINGRX
OS_ADD: +2.50
OS_SPHERE: -0.50
OS_CYLINDER: +0.50
OS_AXIS: 073
OD_CYLINDER: +0.50
OS_ADD: +2.50
OD_AXIS: 004
SPECS_TYPE: PAL
OD_AXIS: 004
SPECS_TYPE: PAL
OS_CYLINDER: +0.50
OD_SPHERE: +0.50
OD_CYLINDER: +0.50
OS_AXIS: 073
OD_ADD: +2.50
OD_SPHERE: +0.50
OD_ADD: +2.50
OS_SPHERE: -0.50

## 2023-06-12 ASSESSMENT — CUP TO DISC RATIO
OD_RATIO: 0.1
OS_RATIO: 0.1

## 2023-06-12 ASSESSMENT — EXTERNAL EXAM - LEFT EYE
OS_EXAM: NORMAL
OS_EXAM: NORMAL

## 2023-06-12 ASSESSMENT — EXTERNAL EXAM - RIGHT EYE
OD_EXAM: NORMAL
OD_EXAM: NORMAL

## 2023-06-12 NOTE — NURSING NOTE
Chief Complaints and History of Present Illnesses   Patient presents with     Follow Up      Epiretinal membrane (ERM) of left eye S/p IOL explantation/scleral fixated IOL (Yamane)/pupilloplasty (10/8/21     Chief Complaint(s) and History of Present Illness(es)     Follow Up            Comments:  Epiretinal membrane (ERM) of left eye S/p IOL explantation/scleral fixated IOL (Yamane)/pupilloplasty (10/8/21          Comments    Pt states no change in VA since last visit   Pt states no flashes, floaters eye pain or redness     Ebony Garrett COT 7:29 AM June 12, 2023

## 2023-06-12 NOTE — PROGRESS NOTES
CC: Dislocated IOL LE, Loss of vision since 9/6/21, seen by on call, diagnosed with dislocated IOL now s/p Scleral fixed IOL left eye     Referring Provider: Dr Katie Thompson    HPI:    Joce Maier 63 year old White male patient presenting for h/o OGR left eye after injury with metal pin for concrete construction.  He is referred today from Dr. Thompson for a left IOL dislocation and concern for a narrow angle on the right eye.      Interval history: s/p IOL explantation/scleral fixated IOL (Yamane)/pupilloplasty (10/8/21). Feels well and stable. No flashes, floaters, curtains. No pain. No changes in vision.    POHx:  OGR OS 2008  PPV/SBP OS  2008 (JT)  Sutured 18.0 D CZ70BD  OS  2008 (Emperatriz)  S/p lens explantation/scleral fixated IOL (Yamane)/pupilloplasty OS (10/8/21).     CTL wearer: no  Glasses: yes    Family Hx of eye disease: no    Gtts Currently Taking:  AT's PRN, left eye      Allergies:  Cefprozil (hives)  PCN (Rash)  Hydrocodone (don't remember)    OCT Mac (1/3/22)  No CME OS    Assessment:    # S/p IOL explantation/scleral fixated IOL (Yamane)/pupilloplasty (10/8/21)  # Decentered scleral fixated IOL  Doing well.  IOP is within normal limits today.  - Off all drops  - Has updated rx  - No CME on OCT mac today- discussed inferior iris-lens touch - unlikely to be a major contributor to inflammation given extensive scarring of iris and no iris movement.     # Traumatic mydriasis and corectopia left eye     # No angle closure or narrow angle right eye  - Observe for now.    PLAN:  - Acceptable IOP off timolol  - AT QID PRN  - discussed artificial iris implant - would defer for now as not bothered by bright lights/glare  - F/u with Dr. Segura 1 year as scheduled    Follow up Cornea: 1 year with VT; sooner if changes    ALSO SEES: Dr. Donna Dsouza MD  Fellow, Cornea, External Disease and Refractive Surgery  Department of Ophthalmology  [facility]    Attending Physician  Attestation:  Complete documentation of historical and exam elements from today's encounter can be found in the full encounter summary report (not reduplicated in this progress note).  I personally obtained the chief complaint(s) and history of present illness.  I confirmed and edited as necessary the review of systems, past medical/surgical history, family history, social history, and examination findings as documented by others; and I examined the patient myself.  I personally reviewed the relevant tests, images, and reports as documented above.  I formulated and edited as necessary the assessment and plan and discussed the findings and management plan with the patient and family. - Faustino Williamson MD    I personally spent great than 40minutes spent on the date of the encounter doing chart review, history and exam, discussion with the patient, documentation, and further activities as noted above. We discussed the complexity of their diagnosis, the need for further information, close monitoring, continued management, and the unknown prognosis for the patient at this time.    Faustino Williamson MD

## 2023-06-12 NOTE — NURSING NOTE
Chief Complaints and History of Present Illnesses   Patient presents with     Follow Up     Epiretinal membrane (ERM) of left eye  S/p IOL explantation/scleral fixated IOL (Yamane)/pupilloplasty (10/8/21)     Chief Complaint(s) and History of Present Illness(es)     Follow Up            Comments: Epiretinal membrane (ERM) of left eye  S/p IOL explantation/scleral fixated IOL (Yamane)/pupilloplasty (10/8/21)          Comments    Pt states no change in VA since last visit  Pt states no flashes, floaters eye pain or redness    Ebony Garrett COT 7:08 AM June 12, 2023

## 2023-06-12 NOTE — PROGRESS NOTES
CC -   H/o OGR OS    INTERVAL HISTORY -  Off gtts    PMH -     Joce Maier is a 63 year old  patient withh/o OGR left eye after injury with metal pin for concrete construction in 2008.  Had sutured IOL placed 2008, dislocated in 2021 with exchange done 10/2021      PAST OCULAR SURGERY  OGR OS 2008  PPV/SBP OS  2008 (JT)  Sutured 18.0 D CZ70BD  OS  2008 (Awan)  PPV/IOL explant/Yamane OS 10/8/21 (DDK+Teri)      RETINAL MAGING  OCT 06/12/23    OD- normal retina, PHF attached  OS- tr ERM, mild outer retina irregularity nasally; stable    UBM 1-3-22  OS - optic-iris touch inferior upright & supine position    OCT RNFL 06/12/23   OD - sector borderline - stable  OS - IN sector abnormal - ?mild progression IN sectors      ASSESSMENT & PLAN      # optic-iris touch OS   - after Yamane 10/2021   - seen on UBM 1-3-22   - mild rebound inflammation 1-3-22 restarted PF TID now on every other day     - 4/19/22 quiet on PF every other day   - stopped PF 6/2022   - 10/31/22 no inflammation or CME off gtts     - quiet today off gtts since 6/2022   - monitor      # h/o OHT OS   - initial IOP 20s-30s in 2021 controlled with cosopt   - OCT RNFL abnormal 4/19/221     - changed to timolol qAM then d/c'd gtts 10/2022   - IOP good, CDR small , ?anomalous/small ONH   - suspect OCT RNFL abnormalities  not glaucoma      - mild progression of OCT RNFL abnormalities OS    - IOP in normal range, ON appearance not typical for glaucoma   - continue to monitor   - recheck RNFL next year (lives far away)  \      #.  H/o OGR left eye 2008   - s/p AC  reconstrcuction and sutured lens (Awan)   - s/p Pars plana vitrectomy (PPV)/SBP (JT)   - s/p PPV/IOL explant/Yamane 10/8/21 (DDK)          #.  vitreous syneresis OD    - advised S/Sx Retinal detachment  6/2023    #. Tr NS OD   - observe      #  Tr ERM OS   - not visually significant,    #. H/o Hollenhorst plaque and peripheral IRH OS   - noted 7/2017 IT arcade   - had w/u by PCP normal 2017  (carotid & TTE)        return to clinic:  1 year, OCT OU, DFE OU, OCT RNFL OU      ATTESTATION     Attending Physician Attestation:      Complete documentation of historical and exam elements from today's encounter can be found in the full encounter summary report (not reduplicated in this progress note).  I personally obtained the chief complaint(s) and history of present illness.  I confirmed and edited as necessary the review of systems, past medical/surgical history, family history, social history, and examination findings as documented by others; and I examined the patient myself.  I personally reviewed the relevant tests, images, and reports as documented above.  I formulated and edited as necessary the assessment and plan and discussed the findings and management plan with the patient and family    Katie Thompson MD, PhD  , Vitreoretinal Surgery  Department of Ophthalmology  [facility]

## 2024-06-10 DIAGNOSIS — H35.372 EPIRETINAL MEMBRANE (ERM) OF LEFT EYE: ICD-10-CM

## 2024-06-10 DIAGNOSIS — H40.052 BORDERLINE GLAUCOMA OF LEFT EYE WITH OCULAR HYPERTENSION: Primary | ICD-10-CM

## 2024-06-24 ENCOUNTER — OFFICE VISIT (OUTPATIENT)
Dept: OPHTHALMOLOGY | Facility: CLINIC | Age: 64
End: 2024-06-24
Attending: OPHTHALMOLOGY
Payer: OTHER MISCELLANEOUS

## 2024-06-24 DIAGNOSIS — H35.372 EPIRETINAL MEMBRANE (ERM) OF LEFT EYE: ICD-10-CM

## 2024-06-24 DIAGNOSIS — H40.052 BORDERLINE GLAUCOMA OF LEFT EYE WITH OCULAR HYPERTENSION: ICD-10-CM

## 2024-06-24 DIAGNOSIS — H40.052 BORDERLINE GLAUCOMA OF LEFT EYE WITH OCULAR HYPERTENSION: Primary | ICD-10-CM

## 2024-06-24 PROCEDURE — 99213 OFFICE O/P EST LOW 20 MIN: CPT | Performed by: OPHTHALMOLOGY

## 2024-06-24 PROCEDURE — 99214 OFFICE O/P EST MOD 30 MIN: CPT | Mod: 25 | Performed by: OPHTHALMOLOGY

## 2024-06-24 PROCEDURE — 92134 CPTRZ OPH DX IMG PST SGM RTA: CPT | Performed by: OPHTHALMOLOGY

## 2024-06-24 PROCEDURE — 92133 CPTRZD OPH DX IMG PST SGM ON: CPT | Performed by: OPHTHALMOLOGY

## 2024-06-24 PROCEDURE — 99207 OCT RETINA SPECTRALIS OU (BOTH EYE): CPT | Mod: 26 | Performed by: OPHTHALMOLOGY

## 2024-06-24 PROCEDURE — 92133 CPTRZD OPH DX IMG PST SGM ON: CPT | Mod: 26 | Performed by: OPHTHALMOLOGY

## 2024-06-24 PROCEDURE — 99213 OFFICE O/P EST LOW 20 MIN: CPT | Mod: 27 | Performed by: OPHTHALMOLOGY

## 2024-06-24 PROCEDURE — 999N000103 HC STATISTIC NO CHARGE FACILITY FEE

## 2024-06-24 RX ORDER — AMOXICILLIN 500 MG
1200 CAPSULE ORAL DAILY
COMMUNITY

## 2024-06-24 ASSESSMENT — CONF VISUAL FIELD
OS_SUPERIOR_TEMPORAL_RESTRICTION: 0
OD_SUPERIOR_NASAL_RESTRICTION: 0
OS_SUPERIOR_TEMPORAL_RESTRICTION: 0
OS_INFERIOR_NASAL_RESTRICTION: 0
OD_SUPERIOR_TEMPORAL_RESTRICTION: 0
OD_INFERIOR_TEMPORAL_RESTRICTION: 0
OD_INFERIOR_TEMPORAL_RESTRICTION: 0
METHOD: COUNTING FINGERS
OS_NORMAL: 1
OS_NORMAL: 1
METHOD: COUNTING FINGERS
OS_SUPERIOR_NASAL_RESTRICTION: 0
OD_SUPERIOR_TEMPORAL_RESTRICTION: 0
OS_INFERIOR_NASAL_RESTRICTION: 0
OD_SUPERIOR_NASAL_RESTRICTION: 0
OD_NORMAL: 1
OS_SUPERIOR_NASAL_RESTRICTION: 0
OD_INFERIOR_NASAL_RESTRICTION: 0
OS_INFERIOR_TEMPORAL_RESTRICTION: 0
OS_INFERIOR_TEMPORAL_RESTRICTION: 0
OD_NORMAL: 1
OD_INFERIOR_NASAL_RESTRICTION: 0

## 2024-06-24 ASSESSMENT — TONOMETRY
IOP_METHOD: TONOPEN
OS_IOP_MMHG: 21
OD_IOP_MMHG: 14
IOP_METHOD: TONOPEN
OS_IOP_MMHG: 21
OD_IOP_MMHG: 14

## 2024-06-24 ASSESSMENT — VISUAL ACUITY
OS_CC+: +2
CORRECTION_TYPE: GLASSES
OS_CC+: +2
OD_CC: 20/20
OS_PH_CC: 20/30
OS_PH_CC+: +2
OS_CC: 20/50
METHOD: SNELLEN - LINEAR
OS_PH_CC: 20/30
CORRECTION_TYPE: GLASSES
METHOD: SNELLEN - LINEAR
OD_CC: 20/20
OS_CC: 20/50
OS_PH_CC+: +2

## 2024-06-24 ASSESSMENT — CUP TO DISC RATIO
OS_RATIO: 0.2
OD_RATIO: 0.2

## 2024-06-24 ASSESSMENT — EXTERNAL EXAM - LEFT EYE
OS_EXAM: NORMAL
OS_EXAM: NORMAL

## 2024-06-24 ASSESSMENT — REFRACTION_WEARINGRX
OD_ADD: +2.50
OS_AXIS: 065
OD_SPHERE: +0.25
OD_CYLINDER: +0.50
OS_SPHERE: -0.50
OS_CYLINDER: +0.25
OS_CYLINDER: +0.25
OS_ADD: +2.50
OD_ADD: +2.50
OD_AXIS: 180
OS_SPHERE: -0.50
OD_SPHERE: +0.25
OS_ADD: +2.50
OD_CYLINDER: +0.50
OS_AXIS: 065
OD_AXIS: 180

## 2024-06-24 ASSESSMENT — EXTERNAL EXAM - RIGHT EYE
OD_EXAM: NORMAL
OD_EXAM: NORMAL

## 2024-06-24 ASSESSMENT — SLIT LAMP EXAM - LIDS
COMMENTS: NORMAL

## 2024-06-24 NOTE — PROGRESS NOTES
CC: Dislocated IOL LE, Loss of vision since 9/6/21, seen by on call, diagnosed with dislocated IOL now s/p Scleral fixed IOL left eye     Referring Provider: Dr Katie Thompson    HPI:    Joce Maier 64 year old White male patient presenting for h/o OGR left eye after injury with metal pin for concrete construction.  He is referred today from Dr. Thompson for a left IOL dislocation and concern for a narrow angle on the right eye.        Interval hx 06/24/2024 Vision is stable, no new flashes/floaters, no redness. Not using any drops. Patient has significant photophobia OS due to iris tissue loss, but it is well controlled with sunglasses.  Chief Complaint(s) and History of Present Illness(es)       Follow Up    In left eye.  Since onset it is stable.  Associated symptoms include photophobia.  Negative for eye pain, flashes and floaters.  Pain was noted as 0/10. Additional comments: Dislocated IOL (intraocular lens)             Comments    He states that his vision has seemed stable in both eyes, since his last eye exam.  He gets some eye dryness in the winter and uses Refresh drops as needed.  His left eye is light sensitive.    Lulú Aviles, COT 10:37 AM  June 24, 2024                      POHx:  OGR OS 2008  PPV/SBP OS  2008 (JIMT)  Sutured 18.0 D CZ70BD OS 2008 (Emperatriz)  S/p lens explantation/scleral fixated IOL (Yamane)/pupilloplasty OS (10/8/21).     CTL wearer: no  Glasses: yes    Family Hx of eye disease: no    Gtts Currently Taking:  AT's PRN, left eye      Allergies:  Cefprozil (hives)  PCN (Rash)  Hydrocodone (don't remember)    OCT Mac (6/24/24)  No CME OS    Assessment:    # S/p IOL explantation/scleral fixated IOL (Yamane)/pupilloplasty (10/8/21)  # Decentered scleral fixated IOL  Doing well.  IOP is within normal limits today.  - Off all drops  - Has updated rx - end of 2023  - No CME on OCT mac today  - no obvious lens-iris touch 6/24/24  - discussed extensively option for artificial iris  implant OS if patient's photophobia becomes intractable.    # Traumatic mydriasis and corectopia left eye     # No angle closure or narrow angle right eye  - Observe for now.    PLAN:  - Acceptable IOP off timolol  - AT QID PRN  - discussed artificial iris implant - would defer for now as not bothered by bright lights/glare  - F/u with Dr. Segura 1 year as scheduled    Follow up Cornea: 2 years with VT; sooner if changes    ALSO SEES: Dr. Donna Olivas MD  Cornea and External Disease Fellow  [facility]     Attending Physician Attestation:  Complete documentation of historical and exam elements from today's encounter can be found in the full encounter summary report (not reduplicated in this progress note).  I personally obtained the chief complaint(s) and history of present illness.  I confirmed and edited as necessary the review of systems, past medical/surgical history, family history, social history, and examination findings as documented by others; and I examined the patient myself.  I personally reviewed the relevant tests, images, and reports as documented above.  I formulated and edited as necessary the assessment and plan and discussed the findings and management plan with the patient and family. - Faustino Williamson MD

## 2024-06-24 NOTE — PROGRESS NOTES
CC -   H/o OGR OS    INTERVAL HISTORY -  no changes to vision      PMH -     Joce Maier is a 64 year old  patient withh/o OGR left eye after injury with metal pin for concrete construction in 2008.  Had sutured IOL placed 2008, dislocated in 2021 with exchange done 10/2021      PAST OCULAR SURGERY  OGR OS 2008  PPV/SBP OS  2008 (JT)  Sutured 18.0 D CZ70BD  OS  2008 (Awan)  PPV/IOL explant/Yamane OS 10/8/21 (DDK+Teri)      RETINAL MAGING  OCT 06/24/24   OD- normal retina, PHF attached  OS- tr ERM, mild outer retina irregularity nasally; stable    UBM 1-3-22  OS - optic-iris touch inferior upright & supine position    OCT RNFL 06/24/24   OD - sector borderline - stable  OS - IN sector abnormal - ?mild progression IN sectors      ASSESSMENT & PLAN      # optic-iris touch OS   - after Yamane 10/2021   - seen on UBM 1-3-22   - mild rebound inflammation 1-3-22 restarted PF TID now on every other day     - 4/19/22 quiet on PF every other day   - stopped PF 6/2022   - 10/31/22 no inflammation or CME off gtts     - quiet today off gtts since 6/2022   - monitor      # h/o OHT OS   - initial IOP 20s-30s in 2021 controlled with cosopt   - OCT RNFL abnormal 4/19/221     - changed to timolol qAM then d/c'd gtts 10/2022   - IOP good, CDR small , ?anomalous/small ONH   - suspect OCT RNFL abnormalities  not glaucoma        - mild progression of OCT RNFL abnormalities OS in 2023   - 2024 ?mild further progression     - IOP in normal range, ON appearance not typical for glaucoma   - may be d/t prior RD, unclear if real progression   - continue to monitor   - advised to be seen locally in 6 months              #.  vitreous syneresis OD    - advised S/Sx Retinal detachment  6/2024    #. Tr NS OD   - observe      #  Tr ERM OS   - not visually significant,    #. H/o Hollenhorst plaque and peripheral IRH OS   - noted 7/2017 IT arcade   - had w/u by PCP normal 2017 (carotid & TTE)          #.  H/o OGR left eye 2008   - s/p AC   reconstrcuction and sutured lens (Emperatriz)   - s/p Pars plana vitrectomy (PPV)/SBP (JT)   - s/p PPV/IOL explant/Yamane 10/8/21 (DDJESSICA)        will follow locally with comprehensive in [address] [place] [address]        ATTESTATION     Attending Physician Attestation:      Complete documentation of historical and exam elements from today's encounter can be found in the full encounter summary report (not reduplicated in this progress note).  I personally obtained the chief complaint(s) and history of present illness.  I confirmed and edited as necessary the review of systems, past medical/surgical history, family history, social history, and examination findings as documented by others; and I examined the patient myself.  I personally reviewed the relevant tests, images, and reports as documented above.  I formulated and edited as necessary the assessment and plan and discussed the findings and management plan with the patient and family    Katie Thompson MD, PhD  , Vitreoretinal Surgery  Department of Ophthalmology  [facility]

## 2024-06-24 NOTE — NURSING NOTE
Chief Complaints and History of Present Illnesses   Patient presents with    Follow Up     Dislocated IOL (intraocular lens)     Chief Complaint(s) and History of Present Illness(es)       Follow Up              Laterality: left eye    Course: stable    Associated symptoms: photophobia.  Negative for eye pain, flashes and floaters    Pain scale: 0/10    Comments: Dislocated IOL (intraocular lens)              Comments    He states that his vision has seemed stable in both eyes, since his last eye exam.  He gets some eye dryness in the winter and uses Refresh drops as needed.  His left eye is light sensitive.    Lulú Aviles, COT 10:37 AM  June 24, 2024

## 2024-06-24 NOTE — NURSING NOTE
Chief Complaints and History of Present Illnesses   Patient presents with    Follow Up     optic-iris touch left eye        Chief Complaint(s) and History of Present Illness(es)       Follow Up              Laterality: left eye    Course: stable    Associated symptoms: photophobia.  Negative for dryness, eye pain, flashes and floaters    Pain scale: 0/10    Comments: optic-iris touch left eye                 Comments    He states that his vision has seemed stable in both eyes, since his last eye exam.  He gets some eye dryness in the winter and uses Refresh drops as needed.  His left eye is light sensitive.    KATIE Avila 10:37 AM  June 24, 2024

## 2024-06-29 ENCOUNTER — HEALTH MAINTENANCE LETTER (OUTPATIENT)
Age: 64
End: 2024-06-29

## 2024-10-25 NOTE — PROGRESS NOTES
CC -   H/o ruptured globe repair    INTERVAL HISTORY -   No new changes, vision stable. No eye pain, flashes, floaters.    HPI -     Joce Maier is a  57 year old year-old patient presenting for h/o OGR left eye after injury with metal pin for concrete construction.      PAST OCULAR SURGERY  PPV/SBP left eye  2008 (JT)  Sutured 18.0 D CZ70BD lens left eye 2008 (Awan)     OCULAR IMAGING  OCT 5-8-18  OD- normal  OS- tr Epiretinal membrane, mild outer retina irregularity nasally    OTHER IMAGING  Carotid US 8/2017 CONCLUSION:     1. Less than 50% stenosis in both internal carotid arteries, by   ultrasound-based velocity criteria.  2. Antegrade flow in the vertebral arteries.    Echo 8/2017  Final Impressions:   1. Normal left ventricular size, borderline wall thickness, normal global systolic function, calculated EF of 58 %.   2. Right ventricular cavity size is mildly enlarged, global systolic RV function is normal.   3. No significant valve disease detected.   4. Grade 1 pattern of LV diastolic filling.      ASSESSMENT & PLAN    1.  H/o OGR left eye   - s/p AC  reconstrcuction and sutured lens (Awan)   - s/p Pars plana vitrectomy (PPV)/SBP (JT)   - looks good today   - retina flat    2.  vitreous syneresis right eye   - advised S/Sx Retinal detachment     3. Tr nuclear sclerosis right eye   - observe    4.  Tr ERM OS   - not visually significant, repeat OCT next visit    5. H/o Hollenhorst plaque and peripheral IRH Left Eye    - noted 7/2017 IT arcade   - had w/u by PCP normal 2017      return to clinic: 1 year with Dr. Thompson with OCT elizabeth Lee MD   Ophthalmology PGY-3      ATTESTATION     Attending Physician Attestation:      Complete documentation of historical and exam elements from today's encounter can be found in the full encounter summary report (not reduplicated in this progress note).  I personally obtained the chief complaint(s) and history of present illness.  I confirmed  and edited as necessary the review of systems, past medical/surgical history, family history, social history, and examination findings as documented by others; and I examined the patient myself.  I personally reviewed the relevant tests, images, and reports as documented above.  I personally reviewed the ophthalmic test(s) associated with this encounter, agree with the interpretation(s) as documented by the resident/fellow, and have edited the corresponding report(s) as necessary.   I formulated and edited as necessary the assessment and plan and discussed the findings and management plan with the patient and family    Katie Thompson MD, PhD  , Vitreoretinal Surgery  Department of Ophthalmology  [facility]       83

## 2025-07-13 ENCOUNTER — HEALTH MAINTENANCE LETTER (OUTPATIENT)
Age: 65
End: 2025-07-13

## (undated) DEVICE — SU VICRYL 8-0 TG140-8DA 12" J548G

## (undated) DEVICE — POSITIONER ARMBOARD FOAM 1PAIR LF FP-ARMB1

## (undated) DEVICE — GLOVE PROTEXIS MICRO 6.5  2D73PM65

## (undated) DEVICE — GLOVE PROTEXIS MICRO 7.5  2D73PM75

## (undated) DEVICE — EYE CANN IRR 30GA  ANTERIOR CHAMBER 581273

## (undated) DEVICE — GLOVE PROTEXIS MICRO 7.0  2D73PM70

## (undated) DEVICE — EYE LENS CARTRIDGE B ALCON MONARCH II 8065977758

## (undated) DEVICE — SU ETHILON 10-0 CS160-6 12" 9000G

## (undated) DEVICE — TAPE TRANSPORE 1"X1.5YD 1534S-1

## (undated) DEVICE — PACK VITRECTOMY/RET CUSTOM ASC PQ15VRU12

## (undated) DEVICE — EYE TIP IRRIGATION & ASPIRATION POLYMER 35D BENT 8065751511

## (undated) DEVICE — ATTENTION CASE CART PLEASE PICK

## (undated) DEVICE — SYR 01ML LL W/O NDL LATEX FREE 309628

## (undated) DEVICE — NDL 30GA 0.5" 305106

## (undated) DEVICE — SYR 05ML LL W/O NDL

## (undated) DEVICE — ESU CORD BIPOLAR GREEN 10-4000

## (undated) DEVICE — EYE PROBE ESU DIATHERMY DSP 25GA 339.21

## (undated) DEVICE — EYE TIP BIPOLAR 18GA ERASER 221250

## (undated) DEVICE — IMPLANTABLE DEVICE
Type: IMPLANTABLE DEVICE | Site: EYE | Status: NON-FUNCTIONAL
Removed: 2021-10-08

## (undated) DEVICE — IMPLANTABLE DEVICE: Type: IMPLANTABLE DEVICE | Site: EYE | Status: NON-FUNCTIONAL

## (undated) DEVICE — ESU EYE HIGH TEMP 65410-183

## (undated) DEVICE — SU PLAIN 6-0 TG140-8 18" 1735G

## (undated) DEVICE — SU POLYPROPYLENE 10-0 2XSSL15 3" J2559N

## (undated) DEVICE — EYE SOL BSS 500ML BAG 0065-1795-04

## (undated) DEVICE — EYE PACK 25GA CONSTELLATION 10,000 CPM PPK9380-04

## (undated) DEVICE — EYE KNIFE SLIT 3.0MM OASIS PE4830-TL

## (undated) DEVICE — GLOVE PROTEXIS MICRO 8.0  2D73PM80

## (undated) DEVICE — EYE NDL RETROBULBAR ATKINSON 25GA 1.5" 581637

## (undated) DEVICE — EYE SHIELD PLASTIC CLEAR UNIVERSAL K9-6050

## (undated) DEVICE — STRAP KNEE/BODY 31143004

## (undated) DEVICE — EYE PREP BETADINE 5% SOLUTION 30ML 0065-0411-30

## (undated) DEVICE — SU VICRYL 10-0 CS140-6 4" V960G

## (undated) DEVICE — APPLICATORS COTTON-TIPPED 3" PKG OF 2 C15050-003

## (undated) DEVICE — SPONGE SPEAR WECK CEL 6/PKG 0008680

## (undated) DEVICE — DRAPE MAYO STAND 23X54 8337

## (undated) DEVICE — LINEN TOWEL PACK X5 5464

## (undated) DEVICE — EYE DRAPE MICROSCOPE UNIVERSAL (BIOM FULL) 08-MK55140

## (undated) DEVICE — EYE KNIFE CRESCENT STR 373808

## (undated) DEVICE — PACK CATARACT UMMC

## (undated) DEVICE — SOL WATER IRRIG 1000ML BOTTLE 2F7114

## (undated) DEVICE — DRSG EYE PAD STERILE 1.63X2.63" NON21600

## (undated) RX ORDER — PROPARACAINE HYDROCHLORIDE 5 MG/ML
SOLUTION/ DROPS OPHTHALMIC
Status: DISPENSED
Start: 2021-10-08

## (undated) RX ORDER — FENTANYL CITRATE 50 UG/ML
INJECTION, SOLUTION INTRAMUSCULAR; INTRAVENOUS
Status: DISPENSED
Start: 2021-10-08

## (undated) RX ORDER — CYCLOPENTOLAT/TROPIC/PHENYLEPH 1%-1%-2.5%
DROPS (EA) OPHTHALMIC (EYE)
Status: DISPENSED
Start: 2021-10-08

## (undated) RX ORDER — PROPOFOL 10 MG/ML
INJECTION, EMULSION INTRAVENOUS
Status: DISPENSED
Start: 2021-10-08

## (undated) RX ORDER — LIDOCAINE HYDROCHLORIDE 20 MG/ML
INJECTION, SOLUTION EPIDURAL; INFILTRATION; INTRACAUDAL; PERINEURAL
Status: DISPENSED
Start: 2021-10-08

## (undated) RX ORDER — ONDANSETRON 2 MG/ML
INJECTION INTRAMUSCULAR; INTRAVENOUS
Status: DISPENSED
Start: 2021-10-08